# Patient Record
Sex: FEMALE | Race: WHITE | NOT HISPANIC OR LATINO | Employment: FULL TIME | URBAN - METROPOLITAN AREA
[De-identification: names, ages, dates, MRNs, and addresses within clinical notes are randomized per-mention and may not be internally consistent; named-entity substitution may affect disease eponyms.]

---

## 2017-01-16 ENCOUNTER — OFFICE VISIT (OUTPATIENT)
Dept: URGENT CARE | Facility: MEDICAL CENTER | Age: 22
End: 2017-01-16
Payer: COMMERCIAL

## 2017-01-16 PROCEDURE — 99213 OFFICE O/P EST LOW 20 MIN: CPT

## 2017-11-04 NOTE — PROGRESS NOTES
Assessment  1  History of Acute URI (465 9) (J06 9)   2  Acute URI (465 9) (J06 9)    Discussion/Summary  Discussion Summary:   1  Increase fluid intake  take much as directed for pain  1 more salt gargles, throat lozenges, honey ENT, for sore throat  Medication Side Effects Reviewed: Possible side effects of new medications were reviewed with the patient/guardian today  Understands and agrees with treatment plan: The treatment plan was reviewed with the patient/guardian  The patient/guardian understands and agrees with the treatment plan   Counseling Documentation With Imm: The patient was counseled regarding diagnostic results,-- instructions for management,-- risk factor reductions,-- prognosis,-- patient and family education,-- impressions,-- risks and benefits of treatment options,-- importance of compliance with treatment  Follow Up Instructions: Follow Up with your Primary Care Provider in 1-2 days  If your symptoms worsen, go to the nearest [place] [address] Emergency Department  Chief Complaint  1  Sore Throat  Chief Complaint Free Text Note Form: Pt c/o sore throat and ear pain x3 days  History of Present Illness  HPI: + cough congestion 2 days, sore throat, ear pain, + chills - fever, No OTC   Hospital Based Practices Required Assessment:   Pain Assessment   the patient states they have pain  The pain is located in the sore throat  The patient describes the pain as aching  (on a scale of 0 to 10, the patient rates the pain at 0 )   Abuse And Domestic Violence Screen    Yes, the patient is safe at home  -- The patient states no one is hurting them  Depression And Suicide Screen  No, the patient has not had thoughts of hurting themself  No, the patient has not felt depressed in the past 7 days  Prefered Language is  english  Primary Language is  english  Readiness To Learn: Receptive  Barriers To Learning: none     Preferred Learning: verbal      Review of Systems  Focused-Female: Constitutional: No fever, no chills, feels well, no tiredness, no recent weight gain or loss  ENT: no ear ache, no loss of hearing, no nosebleeds or nasal discharge, no sore throat or hoarseness-- and-- as noted in HPI  Cardiovascular: no complaints of slow or fast heart rate, no chest pain, no palpitations, no leg claudication or lower extremity edema  Respiratory: no complaints of shortness of breath, no wheezing, no dyspnea on exertion, no orthopnea or PND  Breasts: no complaints of breast pain, breast lump or nipple discharge  Gastrointestinal: no complaints of abdominal pain, no constipation, no nausea or diarrhea, no vomiting, no bloody stools  Genitourinary: no complaints of dysuria, no incontinence, no pelvic pain, no dysmenorrhea, no vaginal discharge or abnormal vaginal bleeding  Musculoskeletal: no complaints of arthralgia, no myalgia, no joint swelling or stiffness, no limb pain or swelling  Integumentary: no complaints of skin rash or lesion, no itching or dry skin, no skin wounds  Neurological: no complaints of headache, no confusion, no numbness or tingling, no dizziness or fainting  Active Problems  1  Anorexia nervosa (307 1) (F50 00)   2  Anxiety (300 00) (F41 9)   3  Borderline personality disorder (301 83) (F60 3)   4  Chest pain (786 50) (R07 9)   5  Encounter for routine gynecological examination (V72 31) (Z01 419)   6  Episodic mood disorder (296 90) (F39)   7  Insect bite of leg (916 4,E906 4) (B82 788I,F49  XXXA)   8  Lymphadenopathy (785 6) (R59 1)   9  Migraine (346 90) (G43 909)   10  Psychosis (298 9) (F29)    Past Medical History  1  History of Acute URI (465 9) (J06 9)   2  No pertinent past medical history  Active Problems And Past Medical History Reviewed: The active problems and past medical history were reviewed and updated today  Family History  Mother    1  No pertinent family history  Family History Reviewed:    The family history was reviewed and updated today  Social History   · Current every day smoker (305 1) (F17 200)  Social History Reviewed: The social history was reviewed and updated today  Surgical History  1  History of  Section   2  History of Foot Surgery  Surgical History Reviewed: The surgical history was reviewed and updated today  Current Meds  Medication List Reviewed: The medication list was reviewed and updated today  Allergies  1  Amoxicillin CAPS   2  RisperDAL TABS   3  Trimox CAPS    Vitals  Signs   Recorded: [id number] 11:20AM   Temperature: 98 4 F  Heart Rate: 92  Respiration: 18  Systolic: 903  Diastolic: 68  Height: 5 ft 1 in  Weight: 124 lb   BMI Calculated: 23 43  BSA Calculated: 1 54  O2 Saturation: 100, RA    Physical Exam    Constitutional   General appearance: No acute distress, well appearing and well nourished  Eyes   Conjunctiva and lids: No swelling, erythema or discharge  Pupils and irises: Equal, round and reactive to light  Ears, Nose, Mouth, and Throat   External inspection of ears and nose: Normal     Otoscopic examination: Tympanic membranes translucent with normal light reflex  Canals patent without erythema  Nasal mucosa, septum, and turbinates: Abnormal  -- Clear nasal mucosa discharge  Oropharynx: Abnormal  -- erythema posterior pharynx  Pulmonary   Respiratory effort: No increased work of breathing or signs of respiratory distress  Auscultation of lungs: Clear to auscultation  Cardiovascular   Auscultation of heart: Normal rate and rhythm, normal S1 and S2, without murmurs  Lymphatic   Palpation of lymph nodes in neck: No lymphadenopathy  Skin   Skin and subcutaneous tissue: Normal without rashes or lesions      Psychiatric   Orientation to person, place, and time: Normal     Mood and affect: Normal        Signatures   Electronically signed by : GHULAM Fernandez; 2017  4:36PM EST                       (Author)    Electronically signed by : AZEB Ayala ; Nov  3 2017  3:37PM EST                       (Co-author)

## 2018-06-01 ENCOUNTER — OFFICE VISIT (OUTPATIENT)
Dept: URGENT CARE | Facility: MEDICAL CENTER | Age: 23
End: 2018-06-01
Payer: COMMERCIAL

## 2018-06-01 ENCOUNTER — HOSPITAL ENCOUNTER (EMERGENCY)
Facility: HOSPITAL | Age: 23
Discharge: HOME | End: 2018-06-01
Admitting: EMERGENCY MEDICINE
Payer: COMMERCIAL

## 2018-06-01 ENCOUNTER — APPOINTMENT (EMERGENCY)
Dept: CT IMAGING | Facility: HOSPITAL | Age: 23
End: 2018-06-01
Payer: COMMERCIAL

## 2018-06-01 VITALS
HEIGHT: 61 IN | DIASTOLIC BLOOD PRESSURE: 90 MMHG | BODY MASS INDEX: 24.52 KG/M2 | SYSTOLIC BLOOD PRESSURE: 136 MMHG | OXYGEN SATURATION: 100 % | TEMPERATURE: 98.8 F | RESPIRATION RATE: 16 BRPM | WEIGHT: 129.85 LBS | HEART RATE: 85 BPM

## 2018-06-01 VITALS
HEART RATE: 96 BPM | RESPIRATION RATE: 20 BRPM | BODY MASS INDEX: 24.56 KG/M2 | WEIGHT: 130 LBS | SYSTOLIC BLOOD PRESSURE: 119 MMHG | DIASTOLIC BLOOD PRESSURE: 86 MMHG

## 2018-06-01 DIAGNOSIS — S06.0X0A CONCUSSION WITHOUT LOSS OF CONSCIOUSNESS, INITIAL ENCOUNTER: Primary | ICD-10-CM

## 2018-06-01 DIAGNOSIS — G44.309 POST-TRAUMATIC HEADACHE: Primary | ICD-10-CM

## 2018-06-01 LAB — EXT PREG TEST URINE: NEGATIVE

## 2018-06-01 PROCEDURE — 81025 URINE PREGNANCY TEST: CPT | Performed by: PHYSICIAN ASSISTANT

## 2018-06-01 PROCEDURE — 99213 OFFICE O/P EST LOW 20 MIN: CPT | Performed by: PHYSICIAN ASSISTANT

## 2018-06-01 PROCEDURE — 70450 CT HEAD/BRAIN W/O DYE: CPT

## 2018-06-01 PROCEDURE — 99284 EMERGENCY DEPT VISIT MOD MDM: CPT

## 2018-06-01 RX ORDER — ONDANSETRON 4 MG/1
4 TABLET, ORALLY DISINTEGRATING ORAL EVERY 8 HOURS PRN
Qty: 20 TABLET | Refills: 0 | Status: SHIPPED | OUTPATIENT
Start: 2018-06-01 | End: 2018-07-31 | Stop reason: ALTCHOICE

## 2018-06-01 NOTE — PATIENT INSTRUCTIONS
Pt sent to [facility] for further evaluation and work up that could not be performed at our facility  Pt's mother is driving
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
## 2018-06-02 NOTE — DISCHARGE INSTRUCTIONS
Concussion   WHAT YOU NEED TO KNOW:   A concussion is a mild brain injury  It is usually caused by a bump or blow to the head from a fall, a motor vehicle crash, or a sports injury  Sometimes being shaken forcefully may cause a concussion  DISCHARGE INSTRUCTIONS:   Have someone else call 911 for the following:   · Someone tries to wake you and cannot do so  · You have a seizure, increasing confusion, or a change in personality  · Your speech becomes slurred, or you have new vision problems  Return to the emergency department if:   · You have a severe headache that does not go away  · You have arm or leg weakness, numbness, or new problems with coordination  · You have blood or clear fluid coming out of the ears or nose  Contact your healthcare provider if:   · You have nausea or are vomiting  · You feel more sleepy than usual     · Your symptoms get worse  · Your symptoms last longer than 6 weeks after the injury  · You have questions or concerns about your condition or care  Medicines:   · Acetaminophen  helps to decrease pain  It is available without a doctor's order  Ask how much to take and how often to take it  Follow directions  Acetaminophen can cause liver damage if not taken correctly  · NSAIDs , such as ibuprofen, help decrease swelling and pain  NSAIDs can cause stomach bleeding or kidney problems in certain people  If you take blood thinner medicine, always ask your healthcare provider if NSAIDs are safe for you  Always read the medicine label and follow directions  · Take your medicine as directed  Contact your healthcare provider if you think your medicine is not helping or if you have side effects  Tell him or her if you are allergic to any medicine  Keep a list of the medicines, vitamins, and herbs you take  Include the amounts, and when and why you take them  Bring the list or the pill bottles to follow-up visits   Carry your medicine list with you in case of an emergency  Follow up with your healthcare provider as directed:  Write down your questions so you remember to ask them during your visits  Self-care:   · Rest  from physical and mental activities as directed  Mental activities are those that require thinking, concentration, and attention  You will need to rest until your symptoms are gone  Rest will allow you to recover from your concussion  Ask your healthcare provider when you can return to work and other daily activities  · Have someone stay with you for the first 24 hours after your injury  Your healthcare provider should be contacted if your symptoms get worse, or you develop new symptoms  · Do not participate in sports and physical activities until your healthcare provider says it is okay  They could make your symptoms worse or lead to another concussion  Your healthcare provider will tell you when it is okay for you to return to sports or physical activities  Prevent another concussion:   · Wear protective sports equipment that fit properly  Helmets help decrease your risk of a serious brain injury  Talk to your healthcare provider about ways you can decrease your risk for a concussion if you play sports  · Wear your seat belt  every time you travel  This helps to decrease your risk of a head injury if you are in a car accident  © 2017 [facility] Information is for End User's use only and may not be sold, redistributed or otherwise used for commercial purposes  All illustrations and images included in CareNotes® are the copyrighted property of [employer] , [employer]  or [employer]  The above information is an  only  It is not intended as medical advice for individual conditions or treatments  Talk to your doctor, nurse or pharmacist before following any medical regimen to see if it is safe and effective for you

## 2018-07-31 ENCOUNTER — OFFICE VISIT (OUTPATIENT)
Dept: OBGYN CLINIC | Facility: MEDICAL CENTER | Age: 23
End: 2018-07-31
Payer: COMMERCIAL

## 2018-07-31 VITALS
HEIGHT: 61 IN | BODY MASS INDEX: 24.35 KG/M2 | SYSTOLIC BLOOD PRESSURE: 122 MMHG | DIASTOLIC BLOOD PRESSURE: 62 MMHG | WEIGHT: 129 LBS

## 2018-07-31 DIAGNOSIS — Z01.419 ENCOUNTER FOR GYNECOLOGICAL EXAMINATION (GENERAL) (ROUTINE) WITHOUT ABNORMAL FINDINGS: Primary | ICD-10-CM

## 2018-07-31 DIAGNOSIS — N92.0 MENORRHAGIA WITH REGULAR CYCLE: ICD-10-CM

## 2018-07-31 DIAGNOSIS — Z76.89 ENCOUNTER FOR ASSESSMENT OF STD EXPOSURE: ICD-10-CM

## 2018-07-31 PROCEDURE — 87591 N.GONORRHOEAE DNA AMP PROB: CPT | Performed by: PHYSICIAN ASSISTANT

## 2018-07-31 PROCEDURE — 87491 CHLMYD TRACH DNA AMP PROBE: CPT | Performed by: PHYSICIAN ASSISTANT

## 2018-07-31 PROCEDURE — G0143 SCR C/V CYTO,THINLAYER,RESCR: HCPCS | Performed by: PHYSICIAN ASSISTANT

## 2018-07-31 PROCEDURE — 99385 PREV VISIT NEW AGE 18-39: CPT | Performed by: PHYSICIAN ASSISTANT

## 2018-07-31 NOTE — ASSESSMENT & PLAN NOTE
Discussed OCP vs Nuvaring vs Depo vs Nexplanon vs IUD  Prefer prog only method due to tobacco use and no intention to quit but would rx combined pill until she is older  Patient wants Mirena  Discussed risks, benefits and possible side effects    Instructions and check list reviewed RTO when complete  Will call when device received for insertion

## 2018-07-31 NOTE — PROGRESS NOTES
Assessment/Plan  Problem List Items Addressed This Visit     Encounter for gynecological examination (general) (routine) without abnormal findings - Primary     Annual exam and pap performed, will call if abnormal or send letter if normal  RTO 1 year         Relevant Orders    Chlamydia/GC amplified DNA by PCR    Liquid-based pap, screening    Menorrhagia with regular cycle     Discussed OCP vs Nuvaring vs Depo vs Nexplanon vs IUD  Prefer prog only method due to tobacco use and no intention to quit but would rx combined pill until she is older  Patient wants Mirena  Discussed risks, benefits and possible side effects  Instructions and check list reviewed RTO when complete  Will call when device received for insertion           Other Visit Diagnoses     Encounter for assessment of STD exposure        Relevant Orders    Chlamydia/GC amplified DNA by PCR        Juan Pope is a 24 y o  female who presents for a new patient annual GYN exam   Periods are regular every 28-30 days  Dysmenorrhea:severe, occurring first 1-2 days of flow  She denies intermenstrual bleeding, spotting, or discharge  She reports that she is sexually active with 1 partner and using tubal ligation for contraception  Patient states menses heavy 3 out of the 7 days, soaking pad every 2 hrs and also very painful where pt misses work sometimes due to pain  She states that she was on the pill and the patch in the past with no problems  (+) tobacco use - no desire to quit  Has tubal ligation for contraception      Last Pap smear: 2015 WNL per pt, no h/o dysplasia  Regular self breast exam: no    Family history of uterine or ovarian cancer: no  Family history of breast cancer: no  Family history of colon cancer: yes - MGF    Menstrual History:  OB History      Para Term  AB Living    2 2            SAB TAB Ectopic Multiple Live Births                      Menarche age: 14  Patient's last menstrual period was 2018 (exact date)  Period Cycle (Days): 28  Period Duration (Days): 6-7  Period Pattern: Regular  Menstrual Flow: Heavy  Menstrual Control: Tampon  Menstrual Control Change Freq (Hours): 2 hrs    Past Medical History:   Diagnosis Date    Migraine      Past Surgical History:   Procedure Laterality Date     SECTION      x 2 boys    FOOT SURGERY Right     TUBAL LIGATION Bilateral 2015     History reviewed  No pertinent family history  Review of Systems  Review of Systems   Constitutional: Negative for chills and fever  Respiratory: Negative for shortness of breath  Cardiovascular: Negative for chest pain  Gastrointestinal: Negative for abdominal pain  Genitourinary: Negative for dysuria, pelvic pain, vaginal bleeding, vaginal discharge and vaginal pain  Negative for breast pain or lumps  Negative for stress urinary incontinence  Neurological: Negative for headaches  Objective   /62   Ht 5' 1" (1 549 m)   Wt 58 5 kg (129 lb)   LMP 2018 (Exact Date)   BMI 24 37 kg/m²     Physical Exam   Constitutional: She is oriented to person, place, and time  She appears well-developed and well-nourished  Neck: No thyromegaly present  Cardiovascular: Normal rate and regular rhythm  Pulmonary/Chest: Effort normal and breath sounds normal  Right breast exhibits no mass, no nipple discharge, no skin change and no tenderness  Left breast exhibits no mass, no nipple discharge, no skin change and no tenderness  Abdominal: Soft  There is no tenderness  There is no rebound and no guarding  Genitourinary: There is no rash or lesion on the right labia  There is no rash or lesion on the left labia  Uterus is not enlarged and not tender  Cervix exhibits no motion tenderness and no discharge  Right adnexum displays no mass and no tenderness  Left adnexum displays no mass and no tenderness  No bleeding in the vagina  No vaginal discharge found     Genitourinary Comments: Pap performed   Neurological: She is alert and oriented to person, place, and time  Psychiatric: She has a normal mood and affect  Nursing note and vitals reviewed

## 2018-08-02 LAB
CHLAMYDIA DNA CVX QL NAA+PROBE: NORMAL
N GONORRHOEA DNA GENITAL QL NAA+PROBE: NORMAL

## 2018-08-03 LAB
LAB AP GYN PRIMARY INTERPRETATION: NORMAL
Lab: NORMAL
PATH INTERP SPEC-IMP: NORMAL

## 2018-08-06 ENCOUNTER — TELEPHONE (OUTPATIENT)
Dept: OBGYN CLINIC | Facility: CLINIC | Age: 23
End: 2018-08-06

## 2018-08-06 DIAGNOSIS — Z20.2 EXPOSURE TO SEXUALLY TRANSMITTED DISEASE (STD): Primary | ICD-10-CM

## 2018-08-06 RX ORDER — AZITHROMYCIN 500 MG/1
TABLET, FILM COATED ORAL
Qty: 2 TABLET | Refills: 0 | Status: SHIPPED | OUTPATIENT
Start: 2018-08-06 | End: 2018-08-06

## 2018-08-06 NOTE — TELEPHONE ENCOUNTER
----- Message from Eladio Garcia PA-C sent at 8/6/2018  7:49 AM EDT -----  Regarding: RE: GC/[facility] results  Sent in rx, this should have gone to provider on call since I wasn't in on Friday  Please call patient asap to inform I sent       ----- Message -----  From: Fletcher Becker RN  Sent: 8/3/2018   3:39 PM  To: Eladio Garcia PA-C  Subject: GC/[facility] results                                 Hey there,    Patient was calling regarding Chlamydia culture and pap result  Thought she was supposed to  antibiotic at pharmacy   Appear normal  Please advise

## 2018-08-09 ENCOUNTER — TELEPHONE (OUTPATIENT)
Dept: OBGYN CLINIC | Facility: CLINIC | Age: 23
End: 2018-08-09

## 2018-08-09 NOTE — TELEPHONE ENCOUNTER
Pt coming Monday in [place] with [facility] for Mirena insertion  She said she was told to call when she got her period, wanted to make sure device was up there

## 2018-08-14 ENCOUNTER — TELEPHONE (OUTPATIENT)
Dept: OBGYN CLINIC | Facility: CLINIC | Age: 23
End: 2018-08-14

## 2018-08-14 NOTE — TELEPHONE ENCOUNTER
[facility] called this morning to check status of IUD  They are still processing paperwork  Pt called and notified of where we are in the process of getting her IUD  Pt was given the phone number to [facility] to followup by next week if she doesn't hear from them  Pt was told we can then schedule her for insertion once we have the IUD here

## 2018-08-20 ENCOUNTER — TELEPHONE (OUTPATIENT)
Dept: OBGYN CLINIC | Facility: CLINIC | Age: 23
End: 2018-08-20

## 2018-08-20 NOTE — TELEPHONE ENCOUNTER
[facility] contacted today  IUD is covered under specialty pharmacy  Kellie Patel was contacted to call [facility] to confirm with them  She will do this today

## 2018-08-21 ENCOUNTER — TELEPHONE (OUTPATIENT)
Dept: OBGYN CLINIC | Facility: CLINIC | Age: 23
End: 2018-08-21

## 2018-08-24 NOTE — TELEPHONE ENCOUNTER
Mirena IUD received from specialty pharmacy today  Pt can be scheduled for insertion  Pt goes to [facility] office, will send IUD there

## 2019-07-26 ENCOUNTER — OFFICE VISIT (OUTPATIENT)
Dept: OBGYN CLINIC | Facility: CLINIC | Age: 24
End: 2019-07-26
Payer: COMMERCIAL

## 2019-07-26 VITALS — WEIGHT: 133 LBS | SYSTOLIC BLOOD PRESSURE: 122 MMHG | DIASTOLIC BLOOD PRESSURE: 70 MMHG | BODY MASS INDEX: 25.13 KG/M2

## 2019-07-26 DIAGNOSIS — R10.2 PELVIC PAIN: Primary | ICD-10-CM

## 2019-07-26 DIAGNOSIS — N92.0 MENORRHAGIA WITH REGULAR CYCLE: ICD-10-CM

## 2019-07-26 DIAGNOSIS — N76.0 BV (BACTERIAL VAGINOSIS): ICD-10-CM

## 2019-07-26 DIAGNOSIS — B96.89 BV (BACTERIAL VAGINOSIS): ICD-10-CM

## 2019-07-26 PROCEDURE — 99213 OFFICE O/P EST LOW 20 MIN: CPT | Performed by: NURSE PRACTITIONER

## 2019-07-26 RX ORDER — METRONIDAZOLE 500 MG/1
500 TABLET ORAL EVERY 12 HOURS SCHEDULED
Qty: 14 TABLET | Refills: 0 | Status: SHIPPED | OUTPATIENT
Start: 2019-07-26 | End: 2019-08-02

## 2019-07-26 NOTE — ASSESSMENT & PLAN NOTE
BV present and fullness noted over LLQ with exam  Discussed possible causes of pelvic pain incl gyn and GI etiologies  Advised pelvic US to eval adnexa  The patient declines gc/chl, as these were recently neg with current partner  She will complete Flagyl for BV and we will follow up when results of pelvic US are available for review

## 2019-07-26 NOTE — ASSESSMENT & PLAN NOTE
The patient has been previously counseled on risks/benefits of Mirena IUD for management of menorrhagia, and she expresses desire to proceed with this plan  Staff was notified   Dwight Doe was advised to schedule insertion with menses

## 2019-07-26 NOTE — ASSESSMENT & PLAN NOTE
Exam c/w Bv  Recommended Flagyl, conservative vulvar hygiene, pelvic rest  Reviewed directions for use, risks/benefits, antabuse effects  F/u PRN if sx not improved  Advised IUD insertion cannot be performed unless BV is resolved due to risk of PID

## 2019-07-26 NOTE — PROGRESS NOTES
Assessment/Plan:    BV (bacterial vaginosis)  Exam c/w Bv  Recommended Flagyl, conservative vulvar hygiene, pelvic rest  Reviewed directions for use, risks/benefits, antabuse effects  F/u PRN if sx not improved  Advised IUD insertion cannot be performed unless BV is resolved due to risk of PID  Menorrhagia with regular cycle  The patient has been previously counseled on risks/benefits of Mirena IUD for management of menorrhagia, and she expresses desire to proceed with this plan  Staff was notified  Richardson Araujo was advised to schedule insertion with menses     Pelvic pain  BV present and fullness noted over LLQ with exam  Discussed possible causes of pelvic pain incl gyn and GI etiologies  Advised pelvic US to eval adnexa  The patient declines gc/chl, as these were recently neg with current partner  She will complete Flagyl for BV and we will follow up when results of pelvic US are available for review  Diagnoses and all orders for this visit:    Pelvic pain  -     US pelvis complete w transvaginal; Future    BV (bacterial vaginosis)  -     metroNIDAZOLE (FLAGYL) 500 mg tablet; Take 1 tablet (500 mg total) by mouth every 12 (twelve) hours for 7 days    Menorrhagia with regular cycle          Subjective:      Patient ID: Tj Cedillo is a 21 y o  female  This patient presents with c/o pelvic pain  Present for 2 months   Described as low midline, suprapubic and sometimes radiating into LLQ   She denies remitting factors   Exacerbated by intercourse   She denies abn discharge, abn bleeding, STI concerns, urinary complaints, C/D/N/V, fever or chills   Menses are historically heavy and this is unchanged   She would like to have Mirena for management    Same partner for >1 yr and gc/chl were neg   BTL for contra   S/p C/s x2          The following portions of the patient's history were reviewed and updated as appropriate: allergies, current medications, past family history, past medical history, past social history, past surgical history and problem list     Review of Systems   Constitutional: Negative  Respiratory: Negative  Cardiovascular: Negative  Gastrointestinal: Negative  Genitourinary: Positive for dyspareunia, menstrual problem and pelvic pain  Negative for dysuria, frequency, vaginal bleeding and vaginal discharge  Musculoskeletal: Negative  Skin: Negative  Neurological: Negative  Psychiatric/Behavioral: Negative  Objective:      /70   Wt 60 3 kg (133 lb)   LMP 07/04/2019   BMI 25 13 kg/m²          Physical Exam   Constitutional: She is oriented to person, place, and time  She appears well-developed and well-nourished  HENT:   Head: Normocephalic and atraumatic  Eyes: Pupils are equal, round, and reactive to light  Neck: Normal range of motion  Pulmonary/Chest: Effort normal    Abdominal: Hernia confirmed negative in the right inguinal area and confirmed negative in the left inguinal area  Genitourinary: Rectum normal and uterus normal  There is no rash, tenderness, lesion or injury on the right labia  There is no rash, tenderness, lesion or injury on the left labia  Cervix exhibits no motion tenderness, no discharge and no friability  Right adnexum displays no mass, no tenderness and no fullness  Left adnexum displays fullness  Left adnexum displays no mass and no tenderness  No erythema, tenderness or bleeding in the vagina  Vaginal discharge found  Musculoskeletal: Normal range of motion  Lymphadenopathy:        Right: No inguinal adenopathy present  Left: No inguinal adenopathy present  Neurological: She is alert and oriented to person, place, and time  Skin: Skin is warm and dry  Psychiatric: She has a normal mood and affect   Her behavior is normal  Judgment and thought content normal

## 2019-07-29 ENCOUNTER — TELEPHONE (OUTPATIENT)
Dept: OBGYN CLINIC | Facility: CLINIC | Age: 24
End: 2019-07-29

## 2019-07-30 NOTE — TELEPHONE ENCOUNTER
Buy and bill [facility]  Pt can be scheduled for insertion

## 2019-07-31 ENCOUNTER — HOSPITAL ENCOUNTER (OUTPATIENT)
Dept: RADIOLOGY | Age: 24
Discharge: HOME | End: 2019-07-31
Payer: COMMERCIAL

## 2019-07-31 DIAGNOSIS — R10.2 PELVIC PAIN: ICD-10-CM

## 2019-07-31 PROCEDURE — 76830 TRANSVAGINAL US NON-OB: CPT

## 2019-07-31 PROCEDURE — 76856 US EXAM PELVIC COMPLETE: CPT

## 2019-08-05 ENCOUNTER — TELEPHONE (OUTPATIENT)
Dept: OBGYN CLINIC | Facility: CLINIC | Age: 24
End: 2019-08-05

## 2019-08-05 NOTE — TELEPHONE ENCOUNTER
----- Message from Su Stevens PA-C sent at 8/5/2019 10:00 AM EDT -----  This is a [facility] patient - please let her know that her pelvic US is normal  Thank you!

## 2019-08-09 ENCOUNTER — PROCEDURE VISIT (OUTPATIENT)
Dept: OBGYN CLINIC | Facility: CLINIC | Age: 24
End: 2019-08-09
Payer: COMMERCIAL

## 2019-08-09 VITALS — WEIGHT: 133.6 LBS | DIASTOLIC BLOOD PRESSURE: 74 MMHG | SYSTOLIC BLOOD PRESSURE: 120 MMHG | BODY MASS INDEX: 25.24 KG/M2

## 2019-08-09 DIAGNOSIS — Z30.430 ENCOUNTER FOR IUD INSERTION: Primary | ICD-10-CM

## 2019-08-09 PROBLEM — N76.0 BV (BACTERIAL VAGINOSIS): Status: RESOLVED | Noted: 2019-07-26 | Resolved: 2019-08-09

## 2019-08-09 PROBLEM — B96.89 BV (BACTERIAL VAGINOSIS): Status: RESOLVED | Noted: 2019-07-26 | Resolved: 2019-08-09

## 2019-08-09 PROCEDURE — 81025 URINE PREGNANCY TEST: CPT | Performed by: NURSE PRACTITIONER

## 2019-08-09 PROCEDURE — 58300 INSERT INTRAUTERINE DEVICE: CPT | Performed by: NURSE PRACTITIONER

## 2019-08-09 NOTE — ASSESSMENT & PLAN NOTE
BV is now resolved and recent pelvic ultrasound normal  IUD inserted without difficulty  Reviewed reasons to call and sx to report including excessive bleeding, pain unrelieved by ibuprofen or symptoms of infection such as fever, chills or foul smelling discharge  Recommended returning to the office in 4- 6 weeks for IUD string check  The patient agrees to the plan

## 2019-08-09 NOTE — PROGRESS NOTES
Iud insertions  Date/Time: 8/9/2019 7:20 AM  Performed by: LEANN Corral  Authorized by: LEANN Corarl     Consent:     Consent obtained:  Verbal and written    Consent given by:  Patient    Procedure risks and benefits discussed: yes      Patient questions answered: yes      Patient agrees, verbalizes understanding, and wants to proceed: yes      Educational handouts given: yes    Procedure:     Pelvic exam performed: yes      Negative GC/chlamydia test: recent negative on 7/31/19  Negative urine pregnancy test: yes      Cervix cleaned and prepped: yes      Speculum placed in vagina: yes      Tenaculum applied to cervix: yes      Uterus sounded: yes      Uterus sound depth (cm):  8    IUD inserted with no complications: yes      IUD type:  Mirena    Strings trimmed: yes    Post-procedure:     Patient tolerated procedure well: yes      Patient will follow up after next period: yes    Comments: This patient presents for IUD insertion  The risks/benefits, SE's/AE's were reviewed and all questions were answered  Written and verbal consent were obtained by myself  Time out was performed and allergies were confirmed  The patient was positioned in lithotomy position and speculum was inserted  Cervix was visualized and cleansed with betadine  Tenaculum was applied to the anterior cervix  Uterus sounded to 8cm  The IUD was inserted without difficulty and the strings were trimmed  Hemostasis was observed and all instruments were removed  The patient tolerated well

## 2020-04-03 DIAGNOSIS — B96.89 BV (BACTERIAL VAGINOSIS): ICD-10-CM

## 2020-04-03 DIAGNOSIS — N76.0 BV (BACTERIAL VAGINOSIS): ICD-10-CM

## 2020-04-06 ENCOUNTER — TELEMEDICINE (OUTPATIENT)
Dept: OBGYN CLINIC | Facility: CLINIC | Age: 25
End: 2020-04-06
Payer: COMMERCIAL

## 2020-04-06 DIAGNOSIS — N76.0 ACUTE VAGINITIS: Primary | ICD-10-CM

## 2020-04-06 DIAGNOSIS — Z97.5 IUD CONTRACEPTION: ICD-10-CM

## 2020-04-06 DIAGNOSIS — N92.0 MENORRHAGIA WITH REGULAR CYCLE: ICD-10-CM

## 2020-04-06 PROCEDURE — 99213 OFFICE O/P EST LOW 20 MIN: CPT | Performed by: NURSE PRACTITIONER

## 2020-04-06 RX ORDER — METRONIDAZOLE 500 MG/1
500 TABLET ORAL EVERY 12 HOURS SCHEDULED
Qty: 14 TABLET | Refills: 0 | Status: SHIPPED | OUTPATIENT
Start: 2020-04-06 | End: 2020-04-13

## 2020-04-06 NOTE — TELEPHONE ENCOUNTER
Please contact the patient to inquire about this refill request  Is she having sx of BV and requested this or was it an auto-refill request?

## 2021-01-01 ENCOUNTER — APPOINTMENT (EMERGENCY)
Dept: ULTRASOUND IMAGING | Facility: HOSPITAL | Age: 26
End: 2021-01-01
Payer: COMMERCIAL

## 2021-01-01 ENCOUNTER — HOSPITAL ENCOUNTER (EMERGENCY)
Facility: HOSPITAL | Age: 26
Discharge: HOME | End: 2021-01-01
Attending: EMERGENCY MEDICINE | Admitting: EMERGENCY MEDICINE
Payer: COMMERCIAL

## 2021-01-01 VITALS
OXYGEN SATURATION: 100 % | TEMPERATURE: 98.5 F | SYSTOLIC BLOOD PRESSURE: 121 MMHG | RESPIRATION RATE: 16 BRPM | DIASTOLIC BLOOD PRESSURE: 85 MMHG | HEART RATE: 92 BPM

## 2021-01-01 DIAGNOSIS — T83.32XA IUD MIGRATION, INITIAL ENCOUNTER: ICD-10-CM

## 2021-01-01 DIAGNOSIS — R10.2 PELVIC PAIN: Primary | ICD-10-CM

## 2021-01-01 LAB
ANION GAP SERPL CALCULATED.3IONS-SCNC: 11 MMOL/L (ref 4–13)
BACTERIA UR QL AUTO: ABNORMAL /HPF
BASOPHILS # BLD AUTO: 0.02 THOUSANDS/ΜL (ref 0–0.1)
BASOPHILS NFR BLD AUTO: 0 % (ref 0–1)
BILIRUB UR QL STRIP: NEGATIVE
BUN SERPL-MCNC: 12 MG/DL (ref 5–25)
CALCIUM SERPL-MCNC: 8.9 MG/DL (ref 8.3–10.1)
CHLORIDE SERPL-SCNC: 102 MMOL/L (ref 100–108)
CLARITY UR: CLEAR
CO2 SERPL-SCNC: 26 MMOL/L (ref 21–32)
COLOR UR: YELLOW
CREAT SERPL-MCNC: 0.84 MG/DL (ref 0.6–1.3)
EOSINOPHIL # BLD AUTO: 0.08 THOUSAND/ΜL (ref 0–0.61)
EOSINOPHIL NFR BLD AUTO: 1 % (ref 0–6)
ERYTHROCYTE [DISTWIDTH] IN BLOOD BY AUTOMATED COUNT: 13.1 % (ref 11.6–15.1)
EXT PREG TEST URINE: NEGATIVE
EXT. CONTROL ED NAV: NORMAL
GFR SERPL CREATININE-BSD FRML MDRD: 96 ML/MIN/1.73SQ M
GLUCOSE SERPL-MCNC: 79 MG/DL (ref 65–140)
GLUCOSE UR STRIP-MCNC: NEGATIVE MG/DL
HCT VFR BLD AUTO: 39.6 % (ref 34.8–46.1)
HGB BLD-MCNC: 13.1 G/DL (ref 11.5–15.4)
HGB UR QL STRIP.AUTO: ABNORMAL
IMM GRANULOCYTES # BLD AUTO: 0.02 THOUSAND/UL (ref 0–0.2)
IMM GRANULOCYTES NFR BLD AUTO: 0 % (ref 0–2)
KETONES UR STRIP-MCNC: NEGATIVE MG/DL
LEUKOCYTE ESTERASE UR QL STRIP: ABNORMAL
LYMPHOCYTES # BLD AUTO: 2.14 THOUSANDS/ΜL (ref 0.6–4.47)
LYMPHOCYTES NFR BLD AUTO: 28 % (ref 14–44)
MCH RBC QN AUTO: 30.3 PG (ref 26.8–34.3)
MCHC RBC AUTO-ENTMCNC: 33.1 G/DL (ref 31.4–37.4)
MCV RBC AUTO: 92 FL (ref 82–98)
MONOCYTES # BLD AUTO: 0.48 THOUSAND/ΜL (ref 0.17–1.22)
MONOCYTES NFR BLD AUTO: 6 % (ref 4–12)
NEUTROPHILS # BLD AUTO: 4.91 THOUSANDS/ΜL (ref 1.85–7.62)
NEUTS SEG NFR BLD AUTO: 65 % (ref 43–75)
NITRITE UR QL STRIP: NEGATIVE
NON-SQ EPI CELLS URNS QL MICRO: ABNORMAL /HPF
NRBC BLD AUTO-RTO: 0 /100 WBCS
PH UR STRIP.AUTO: 8 [PH]
PLATELET # BLD AUTO: 234 THOUSANDS/UL (ref 149–390)
PMV BLD AUTO: 10.3 FL (ref 8.9–12.7)
POTASSIUM SERPL-SCNC: 3.5 MMOL/L (ref 3.5–5.3)
PROT UR STRIP-MCNC: NEGATIVE MG/DL
RBC # BLD AUTO: 4.32 MILLION/UL (ref 3.81–5.12)
RBC #/AREA URNS AUTO: ABNORMAL /HPF
SODIUM SERPL-SCNC: 139 MMOL/L (ref 136–145)
SP GR UR STRIP.AUTO: 1.01 (ref 1–1.03)
UROBILINOGEN UR QL STRIP.AUTO: 0.2 E.U./DL
WBC # BLD AUTO: 7.65 THOUSAND/UL (ref 4.31–10.16)
WBC #/AREA URNS AUTO: ABNORMAL /HPF

## 2021-01-01 PROCEDURE — 96372 THER/PROPH/DIAG INJ SC/IM: CPT

## 2021-01-01 PROCEDURE — 99284 EMERGENCY DEPT VISIT MOD MDM: CPT

## 2021-01-01 PROCEDURE — 87591 N.GONORRHOEAE DNA AMP PROB: CPT | Performed by: PHYSICIAN ASSISTANT

## 2021-01-01 PROCEDURE — NC001 PR NO CHARGE: Performed by: PHYSICIAN ASSISTANT

## 2021-01-01 PROCEDURE — 80048 BASIC METABOLIC PNL TOTAL CA: CPT | Performed by: PHYSICIAN ASSISTANT

## 2021-01-01 PROCEDURE — 36415 COLL VENOUS BLD VENIPUNCTURE: CPT | Performed by: PHYSICIAN ASSISTANT

## 2021-01-01 PROCEDURE — NC001 PR NO CHARGE: Performed by: STUDENT IN AN ORGANIZED HEALTH CARE EDUCATION/TRAINING PROGRAM

## 2021-01-01 PROCEDURE — 81001 URINALYSIS AUTO W/SCOPE: CPT | Performed by: PHYSICIAN ASSISTANT

## 2021-01-01 PROCEDURE — 76830 TRANSVAGINAL US NON-OB: CPT

## 2021-01-01 PROCEDURE — 85025 COMPLETE CBC W/AUTO DIFF WBC: CPT | Performed by: PHYSICIAN ASSISTANT

## 2021-01-01 PROCEDURE — 81025 URINE PREGNANCY TEST: CPT | Performed by: PHYSICIAN ASSISTANT

## 2021-01-01 PROCEDURE — 76856 US EXAM PELVIC COMPLETE: CPT

## 2021-01-01 PROCEDURE — 99285 EMERGENCY DEPT VISIT HI MDM: CPT | Performed by: PHYSICIAN ASSISTANT

## 2021-01-01 PROCEDURE — 87491 CHLMYD TRACH DNA AMP PROBE: CPT | Performed by: PHYSICIAN ASSISTANT

## 2021-01-01 RX ORDER — MEDROXYPROGESTERONE ACETATE 150 MG/ML
150 INJECTION, SUSPENSION INTRAMUSCULAR ONCE
Status: COMPLETED | OUTPATIENT
Start: 2021-01-01 | End: 2021-01-01

## 2021-01-01 RX ADMIN — MEDROXYPROGESTERONE ACETATE 150 MG: 150 INJECTION, SUSPENSION, EXTENDED RELEASE INTRAMUSCULAR at 22:07

## 2021-01-01 NOTE — ED PROVIDER NOTES
History  Chief Complaint   Patient presents with    Pelvis Injury     pt reports pelvic pain and vaginal bleeding x2 days  believes IUD moved  Patient presents to the emergency room with complaints of 2 day history of pelvic pain  She describes it as cramping  She has been spotting since the onset  She last had a normal   One week ago  She denies any vaginal discharge  She states it does not hurt when she has sexual intercourse but when she was finished she had some cramping following the event  She denies any chance of pregnancy as she has an IUD that was placed in October of 2019  She has not had any problems with her IUD  Her partner is present and he is asymptomatic for dysuria or urethral discharge  She denies any fever chills  Past medical history is positive for migraines  She has had 2 C section deliveries, foot surgery, tubal ligation  History provided by:  Patient  Abdominal Pain  Pain location:  RLQ and LLQ  Pain quality: cramping    Pain radiation: both anterior thighs    Pain severity:  Mild  Onset quality:  Gradual  Duration:  2 days  Timing:  Intermittent  Progression:  Waxing and waning  Chronicity:  New  Context: not alcohol use, not awakening from sleep, not diet changes, not eating, not laxative use, not medication withdrawal, not previous surgeries, not recent illness, not recent sexual activity, not recent travel, not retching, not sick contacts, not suspicious food intake and not trauma    Relieved by:  Nothing  Worsened by:  Palpation and movement  Ineffective treatments:  None tried  Associated symptoms: vaginal bleeding    Associated symptoms: no anorexia, no belching, no chest pain, no chills, no constipation, no cough, no diarrhea, no dysuria, no fatigue, no fever, no flatus, no hematemesis, no hematochezia, no hematuria, no melena, no nausea, no shortness of breath, no sore throat, no vaginal discharge and no vomiting    Risk factors: no alcohol abuse, no aspirin use, not elderly, has not had multiple surgeries, no NSAID use, not obese, not pregnant and no recent hospitalization        None       Past Medical History:   Diagnosis Date    IUD contraception 2019    Mirena     Migraine        Past Surgical History:   Procedure Laterality Date     SECTION      x 2 boys    FOOT SURGERY Right     TUBAL LIGATION Bilateral 2015       History reviewed  No pertinent family history  I have reviewed and agree with the history as documented  E-Cigarette/Vaping     E-Cigarette/Vaping Substances     Social History     Tobacco Use    Smoking status: Current Every Day Smoker     Packs/day: 0 50    Smokeless tobacco: Never Used   Substance Use Topics    Alcohol use: Yes     Comment: Social    Drug use: No       Review of Systems   Constitutional: Positive for activity change  Negative for appetite change, chills, diaphoresis, fatigue and fever  HENT: Negative for sore throat  Respiratory: Negative for cough and shortness of breath  Cardiovascular: Negative for chest pain  Gastrointestinal: Positive for abdominal pain  Negative for anorexia, constipation, diarrhea, flatus, hematemesis, hematochezia, melena, nausea and vomiting  Genitourinary: Positive for vaginal bleeding  Negative for dysuria, frequency, hematuria, urgency and vaginal discharge  Musculoskeletal: Negative for back pain  Skin: Negative for color change and rash  Psychiatric/Behavioral: Negative for confusion  All other systems reviewed and are negative  Physical Exam  Physical Exam  Vitals signs and nursing note reviewed  Constitutional:       General: She is not in acute distress  Appearance: Normal appearance  She is normal weight  She is not ill-appearing, toxic-appearing or diaphoretic  HENT:      Head: Normocephalic and atraumatic  Right Ear: External ear normal       Left Ear: External ear normal    Eyes:      General:         Right eye: No discharge  Left eye: No discharge  Conjunctiva/sclera: Conjunctivae normal    Cardiovascular:      Rate and Rhythm: Normal rate and regular rhythm  Pulses: Normal pulses  Heart sounds: Normal heart sounds  Pulmonary:      Effort: Pulmonary effort is normal       Breath sounds: Normal breath sounds  Abdominal:      General: Abdomen is flat  There is no distension  Tenderness: There is abdominal tenderness  There is no right CVA tenderness, left CVA tenderness, guarding or rebound  Comments: Bilateral adenexal tenderness  Skin:     General: Skin is warm  Capillary Refill: Capillary refill takes less than 2 seconds  Findings: No rash  Neurological:      Mental Status: She is alert and oriented to person, place, and time  Psychiatric:         Mood and Affect: Mood normal          Behavior: Behavior normal          Thought Content:  Thought content normal          Judgment: Judgment normal          Vital Signs  ED Triage Vitals   Temperature Pulse Respirations Blood Pressure SpO2   01/01/21 1848 01/01/21 1848 01/01/21 1848 01/01/21 1848 01/01/21 1848   98 5 °F (36 9 °C) 93 18 135/85 100 %      Temp Source Heart Rate Source Patient Position - Orthostatic VS BP Location FiO2 (%)   01/01/21 1848 01/01/21 1848 -- 01/01/21 2018 --   Oral Monitor  Left arm       Pain Score       --                  Vitals:    01/01/21 1848 01/01/21 2018 01/01/21 2159   BP: 135/85 125/81 121/85   Pulse: 93 94 92         Visual Acuity      ED Medications  Medications   medroxyPROGESTERone (DEPO-PROVERA) IM injection 150 mg (150 mg Intramuscular Given 1/1/21 2207)       Diagnostic Studies  Results Reviewed     Procedure Component Value Units Date/Time    Chlamydia/GC amplified DNA by PCR [[id number]]  (Abnormal) Collected: 01/01/21 1900    Lab Status: Final result Specimen: Urine, Other Updated: 01/02/21 0713     N gonorrhoeae, DNA Probe Negative     Chlamydia trachomatis, DNA Probe Positive    Narrative:      Test performed using PCR amplification of target DNA  This test is intended as an aid in the diagnosis of Chlamydial and gonococcal disease  This test has not been evaluated in patients younger than 15years of age and is not recommended for evaluation of suspected sexual abuse  Additional testing is recommended when the results do not correlate with clinical signs and symptoms        Urine Microscopic [[id number]]  (Abnormal) Collected: 01/01/21 1900    Lab Status: Final result Specimen: Urine, Other Updated: 01/01/21 2013     RBC, UA 2-4 /hpf      WBC, UA 4-10 /hpf      Epithelial Cells Occasional /hpf      Bacteria, UA Occasional /hpf     Basic metabolic panel [[id number]] Collected: 01/01/21 1916    Lab Status: Final result Specimen: Blood from Arm, Right Updated: 01/01/21 1937     Sodium 139 mmol/L      Potassium 3 5 mmol/L      Chloride 102 mmol/L      CO2 26 mmol/L      ANION GAP 11 mmol/L      BUN 12 mg/dL      Creatinine 0 84 mg/dL      Glucose 79 mg/dL      Calcium 8 9 mg/dL      eGFR 96 ml/min/1 73sq m     Narrative:      [place] guidelines for Chronic Kidney Disease (CKD):     Stage 1 with normal or high GFR (GFR > 90 mL/min/1 73 square meters)    Stage 2 Mild CKD (GFR = 60-89 mL/min/1 73 square meters)    Stage 3A Moderate CKD (GFR = 45-59 mL/min/1 73 square meters)    Stage 3B Moderate CKD (GFR = 30-44 mL/min/1 73 square meters)    Stage 4 Severe CKD (GFR = 15-29 mL/min/1 73 square meters)    Stage 5 End Stage CKD (GFR <15 mL/min/1 73 square meters)  Note: GFR calculation is accurate only with a steady state creatinine    CBC and differential [[id number]] Collected: 01/01/21 1916    Lab Status: Final result Specimen: Blood from Arm, Right Updated: 01/01/21 1933     WBC 7 65 Thousand/uL      RBC 4 32 Million/uL      Hemoglobin 13 1 g/dL      Hematocrit 39 6 %      MCV 92 fL      MCH 30 3 pg      MCHC 33 1 g/dL      RDW 13 1 %      MPV 10 3 fL      Platelets 681 Thousands/uL nRBC 0 /100 WBCs      Neutrophils Relative 65 %      Immat GRANS % 0 %      Lymphocytes Relative 28 %      Monocytes Relative 6 %      Eosinophils Relative 1 %      Basophils Relative 0 %      Neutrophils Absolute 4 91 Thousands/µL      Immature Grans Absolute 0 02 Thousand/uL      Lymphocytes Absolute 2 14 Thousands/µL      Monocytes Absolute 0 48 Thousand/µL      Eosinophils Absolute 0 08 Thousand/µL      Basophils Absolute 0 02 Thousands/µL     UA (URINE) with reflex to Scope [[id number]]  (Abnormal) Collected: 01/01/21 1900    Lab Status: Final result Specimen: Urine, Other Updated: 01/01/21 1920     Color, UA Yellow     Clarity, UA Clear     Specific Gravity, UA 1 015     pH, UA 8 0     Leukocytes, UA Small     Nitrite, UA Negative     Protein, UA Negative mg/dl      Glucose, UA Negative mg/dl      Ketones, UA Negative mg/dl      Urobilinogen, UA 0 2 E U /dl      Bilirubin, UA Negative     Blood, UA Large    POCT pregnancy, urine [[id number]]  (Normal) Resulted: 01/01/21 1904    Lab Status: Final result Updated: 01/01/21 1905     EXT PREG TEST UR (Ref: Negative) negative     Control valid                 US pelvis complete w transvaginal   ED Interpretation by Camelia Amin PA-C (01/01 2039)   Low position of an IUD extending into the endocervix  Final Result by Harsh Arnold MD (01/01 2037)      Low position of an IUD extending into the endocervix  The study was marked in [facility] for immediate notification  Workstation performed: [id number]                    Procedures  Procedures         ED Course  ED Course as of Jan 02 1226   [facility] 2021 2051 Patient signed out to abdomen all the  Awaiting the gyn consult and final disposition  SBIRT 22yo+      Most Recent Value   SBIRT (24 yo +)   In order to provide better care to our patients, we are screening all of our patients for alcohol and drug use   Would it be okay to ask you these screening questions? No Filed at: 01/01/2021 2200                    [facility]  Number of Diagnoses or Management Options  IUD migration, initial encounter: new and requires workup  Pelvic pain: new and requires workup     Amount and/or Complexity of Data Reviewed  Clinical lab tests: ordered and reviewed  Tests in the radiology section of CPT®: ordered and reviewed  Tests in the medicine section of CPT®: ordered and reviewed    Risk of Complications, Morbidity, and/or Mortality  Presenting problems: high  Diagnostic procedures: high  Management options: high  General comments: Patient presents emergency room with complaints of pelvic pain and bleeding for the past 2 days  Her most recent period ended a week ago  She has a history of having an IUD in questions whether not it is out of place  She does not complain of pain with intercourse but does have cramping afterward  She was seen and evaluated  Laboratory studies were taken and were reviewed  Ultrasound of her pelvis was performed which demonstrated the IUD to be in the endocervical canal   A consult for gynecology was placed  They came down to the emergency room and removed the IUD  Patient was discharged home was instructed to follow up with her gynecologist     Patient Progress  Patient progress: stable      Disposition  Final diagnoses:   Pelvic pain   IUD migration, initial encounter     Time reflects when diagnosis was documented in both MDM as applicable and the Disposition within this note     Time User Action Codes Description Comment    1/1/2021  8:43 PM Ledy Fitting [R10 2] Pelvic pain     1/1/2021  8:43 PM Berna Perdue IUD migration, initial encounter       ED Disposition     ED Disposition Condition Date/Time Comment    Discharge Stable Fri Jan 1, 2021 10:27 PM Heydi Francisco discharge to home/self care              Follow-up Information     Follow up With Specialties Details Why Contact Info    your Gynecologist [address] [facility] Schedule an appointment as soon as possible for a visit  [id number] [facility] [address] [facility]  [phone]            There are no discharge medications for this patient  No discharge procedures on file      PDMP Review     None          ED Provider  Electronically Signed by           Miguel Wagner PA-C  01/02/21 6380

## 2021-01-02 LAB
C TRACH DNA SPEC QL NAA+PROBE: POSITIVE
N GONORRHOEA DNA SPEC QL NAA+PROBE: NEGATIVE

## 2021-01-02 NOTE — RESULT ENCOUNTER NOTE
Patient returned call to the emergency department  Name and date of birth use as positive patient identifiers  Made aware of test result which was positive for chlamydia  Prescription for doxycycline 100 mg PO BID   For 7 days called in to [facility] on [address] in [place]

## 2021-01-02 NOTE — CONSULTS
Consultation - Gynecology  Abeba Madsen 31 y o  female MRN: [id number]  Unit/Bed#:  Encounter: [id number]      Consults      Chief Complaint   Patient presents with    Pelvis Injury     pt reports pelvic pain and vaginal bleeding x2 days  believes IUD moved  History of Present Illness   Physician Requesting Consult: No att  providers found  Reason for Consult / Principal Problem: IUD in IVAN, bleeding cramping  Subspeciality: General GYN  HPI: Abeba Madsen is a 31 y o  female who presents with a multiday history of heavy bleeding and cramping  She has had a Mirena IUD she has been very happy with for 2 years however had pain and heavier than usual bleeding for the past couple days and so presented to the ED  UPT was negative  TVUS revealed IUD in lower uterine segment extending into endocervical canal     Patient counseled on options of removal versus leaving in situ with back up method of contraception as effectiveness in lessened when malpositioned  Patient opted for removal secondary to pain  She chose Depo Provera as a bridge before being able to replace her IUD  Review of Systems   Constitutional: Negative  HENT: Negative  Eyes: Negative  Respiratory: Negative  Cardiovascular: Negative  Gastrointestinal: Negative  Endocrine: Negative  Genitourinary: Positive for pelvic pain and vaginal bleeding  Musculoskeletal: Negative  Skin: Negative  Neurological: Negative  Psychiatric/Behavioral: Negative          Historical Information   Past Medical History:   Diagnosis Date    IUD contraception 2019    Mirena     Migraine      Past Surgical History:   Procedure Laterality Date     SECTION      x 2 boys    FOOT SURGERY Right     TUBAL LIGATION Bilateral 2015     OB History    Para Term  AB Living   2 2           SAB TAB Ectopic Multiple Live Births                  # Outcome Date GA Lbr Negro/2nd Weight Sex Delivery Anes PTL Lv   2 Para 09/26/15    M CS-Unspec         Birth Comments: Tanisha Bostons"   1 Para 10/29/13    M CS-Unspec         Birth Comments: "Tor Urena"     History reviewed  No pertinent family history  Social History   Social History     Substance and Sexual Activity   Alcohol Use Yes    Comment: Social     Social History     Substance and Sexual Activity   Drug Use No     Social History     Tobacco Use   Smoking Status Current Every Day Smoker    Packs/day: 0 50   Smokeless Tobacco Never Used       Meds/Allergies   Current Facility-Administered Medications   Medication Dose Route Frequency    medroxyPROGESTERone (DEPO-PROVERA) IM injection 150 mg  150 mg Intramuscular Once         Allergies   Allergen Reactions    Risperidone Hives    Trimox [Amoxicillin] Hives     Reaction Date: 16Jun2011;        Objective   Vitals: Blood pressure 121/85, pulse 92, temperature 98 5 °F (36 9 °C), temperature source Oral, resp  rate 16, SpO2 100 %  There is no height or weight on file to calculate BMI  No intake or output data in the 24 hours ending 01/01/21 2200    Invasive Devices     Peripheral Intravenous Line            Peripheral IV 01/01/21 Right Antecubital less than 1 day                Physical Exam  Constitutional:       General: She is not in acute distress  Appearance: She is well-developed  She is not diaphoretic  HENT:      Head: Normocephalic and atraumatic  Eyes:      Pupils: Pupils are equal, round, and reactive to light  Neck:      Musculoskeletal: Normal range of motion  Trachea: No tracheal deviation  Cardiovascular:      Rate and Rhythm: Normal rate and regular rhythm  Heart sounds: Normal heart sounds  No murmur  No friction rub  No gallop  Pulmonary:      Effort: Pulmonary effort is normal  No respiratory distress  Breath sounds: Normal breath sounds  No stridor  No wheezing or rales  Abdominal:      General: There is no distension  Palpations: Abdomen is soft  There is no mass  Tenderness: There is no abdominal tenderness  There is no guarding or rebound  Genitourinary:     Vagina: Bleeding present  Cervix: Cervical bleeding present  No friability or erythema  Musculoskeletal: Normal range of motion  General: No tenderness or deformity  Skin:     General: Skin is warm and dry  Coloration: Skin is not pale  Findings: No erythema or rash  Neurological:      Mental Status: She is alert and oriented to person, place, and time        Coordination: Coordination normal    Psychiatric:         Mood and Affect: Mood normal          Behavior: Behavior normal          Lab Results:   Recent Results (from the past 24 hour(s))   UA (URINE) with reflex to Scope    Collection Time: 01/01/21  7:00 PM   Result Value Ref Range    Color, UA Yellow     Clarity, UA Clear     Specific Bessie, UA 1 015 1 003 - 1 030    pH, UA 8 0 4 5, 5 0, 5 5, 6 0, 6 5, 7 0, 7 5, 8 0    Leukocytes, UA Small (A) Negative    Nitrite, UA Negative Negative    Protein, UA Negative Negative mg/dl    Glucose, UA Negative Negative mg/dl    Ketones, UA Negative Negative mg/dl    Urobilinogen, UA 0 2 0 2, 1 0 E U /dl E U /dl    Bilirubin, UA Negative Negative    Blood, UA Large (A) Negative   Urine Microscopic    Collection Time: 01/01/21  7:00 PM   Result Value Ref Range    RBC, UA 2-4 None Seen, 0-1, 1-2, 2-4, 0-5 /hpf    WBC, UA 4-10 (A) None Seen, 0-1, 1-2, 0-5, 2-4 /hpf    Epithelial Cells Occasional None Seen, Occasional /hpf    Bacteria, UA Occasional None Seen, Occasional /hpf   POCT pregnancy, urine    Collection Time: 01/01/21  7:04 PM   Result Value Ref Range    EXT PREG TEST UR (Ref: Negative) negative     Control valid    CBC and differential    Collection Time: 01/01/21  7:16 PM   Result Value Ref Range    WBC 7 65 4 31 - 10 16 Thousand/uL    RBC 4 32 3 81 - 5 12 Million/uL    Hemoglobin 13 1 11 5 - 15 4 g/dL    Hematocrit 39 6 34 8 - 46 1 %    MCV 92 82 - 98 fL    MCH 30 3 26 8 - 34 3 pg MCHC 33 1 31 4 - 37 4 g/dL    RDW 13 1 11 6 - 15 1 %    MPV 10 3 8 9 - 12 7 fL    Platelets 738 231 - 750 Thousands/uL    nRBC 0 /100 WBCs    Neutrophils Relative 65 43 - 75 %    Immat GRANS % 0 0 - 2 %    Lymphocytes Relative 28 14 - 44 %    Monocytes Relative 6 4 - 12 %    Eosinophils Relative 1 0 - 6 %    Basophils Relative 0 0 - 1 %    Neutrophils Absolute 4 91 1 85 - 7 62 Thousands/µL    Immature Grans Absolute 0 02 0 00 - 0 20 Thousand/uL    Lymphocytes Absolute 2 14 0 60 - 4 47 Thousands/µL    Monocytes Absolute 0 48 0 17 - 1 22 Thousand/µL    Eosinophils Absolute 0 08 0 00 - 0 61 Thousand/µL    Basophils Absolute 0 02 0 00 - 0 10 Thousands/µL   Basic metabolic panel    Collection Time: 01/01/21  7:16 PM   Result Value Ref Range    Sodium 139 136 - 145 mmol/L    Potassium 3 5 3 5 - 5 3 mmol/L    Chloride 102 100 - 108 mmol/L    CO2 26 21 - 32 mmol/L    ANION GAP 11 4 - 13 mmol/L    BUN 12 5 - 25 mg/dL    Creatinine 0 84 0 60 - 1 30 mg/dL    Glucose 79 65 - 140 mg/dL    Calcium 8 9 8 3 - 10 1 mg/dL    eGFR 96 ml/min/1 73sq m       Imaging:  Pelvic U/S   Imaging Studies: I have personally reviewed pertinent reports  and I have personally reviewed pertinent films in PACS      Assessment/Plan     Assessment:  32 yo with malpositioned Mirena IUD  Plan:  Removal  Depo Provera bridge  F/u at [facility] for replacement    Code Status: No Order  Advance Directive and Living Will:      Power of :    POLST:      Counseling / Coordination of Care  Total floor / unit time spent today20 minutes  minutes  Greater than 50% of total time was spent with the patient and / or family counseling and / or coordination of care   A description of the counseling / coordination of care: counseling on effective contraception and follow up    Meredith Lay MD  1/1/2021  10:00 PM

## 2021-01-02 NOTE — ED NOTES
Medroxyprogesterone requested from [facility], [facility]  01/01/21 7624

## 2021-01-02 NOTE — ED PROVIDER NOTES
Emergency Department Sign Out Note        Sign out and transfer of care from [facility] ED  See Separate Emergency Department note  The patient, Jon Loaiza, was evaluated by the previous provider for       Workup Completed:  Labs, Ultrasound    ED Course / Workup Pending (followup):  OBGYN Consult--Dr Graciela Lyn/PGY III GYN Resident states she will discuss with patient and form dispo/plan for follow up +/- removal    Labs Reviewed   URINALYSIS WITH REFLEX TO SCOPE - Abnormal       Result Value Ref Range Status    Color, UA Yellow   Final    Clarity, UA Clear   Final    Specific [place], UA 1 015  1 003 - 1 030 Final    pH, UA 8 0  4 5, 5 0, 5 5, 6 0, 6 5, 7 0, 7 5, 8 0 Final    Leukocytes, UA Small (*) Negative Final    Nitrite, UA Negative  Negative Final    Protein, UA Negative  Negative mg/dl Final    Glucose, UA Negative  Negative mg/dl Final    Ketones, UA Negative  Negative mg/dl Final    Urobilinogen, UA 0 2  0 2, 1 0 E U /dl E U /dl Final    Bilirubin, UA Negative  Negative Final    Blood, UA Large (*) Negative Final   URINE MICROSCOPIC - Abnormal    RBC, UA 2-4  None Seen, 0-1, 1-2, 2-4, 0-5 /hpf Final    WBC, UA 4-10 (*) None Seen, 0-1, 1-2, 0-5, 2-4 /hpf Final    Epithelial Cells Occasional  None Seen, Occasional /hpf Final    Bacteria, UA Occasional  None Seen, Occasional /hpf Final   POCT PREGNANCY, URINE - Normal    EXT PREG TEST UR (Ref: Negative) negative   Final    Control valid   Final   CHLAMYDIA /GC AMPLIFIED DNA   CBC AND DIFFERENTIAL    WBC 7 65  4 31 - 10 16 Thousand/uL Final    RBC 4 32  3 81 - 5 12 Million/uL Final    Hemoglobin 13 1  11 5 - 15 4 g/dL Final    Hematocrit 39 6  34 8 - 46 1 % Final    MCV 92  82 - 98 fL Final    MCH 30 3  26 8 - 34 3 pg Final    MCHC 33 1  31 4 - 37 4 g/dL Final    RDW 13 1  11 6 - 15 1 % Final    MPV 10 3  8 9 - 12 7 fL Final    Platelets 539  210 - 390 Thousands/uL Final    nRBC 0  /100 WBCs Final    Neutrophils Relative 65  43 - 75 % Final    Immat GRANS % 0  0 - 2 % Final    Lymphocytes Relative 28  14 - 44 % Final    Monocytes Relative 6  4 - 12 % Final    Eosinophils Relative 1  0 - 6 % Final    Basophils Relative 0  0 - 1 % Final    Neutrophils Absolute 4 91  1 85 - 7 62 Thousands/µL Final    Immature Grans Absolute 0 02  0 00 - 0 20 Thousand/uL Final    Lymphocytes Absolute 2 14  0 60 - 4 47 Thousands/µL Final    Monocytes Absolute 0 48  0 17 - 1 22 Thousand/µL Final    Eosinophils Absolute 0 08  0 00 - 0 61 Thousand/µL Final    Basophils Absolute 0 02  0 00 - 0 10 Thousands/µL Final   BASIC METABOLIC PANEL    Sodium 425  136 - 145 mmol/L Final    Potassium 3 5  3 5 - 5 3 mmol/L Final    Chloride 102  100 - 108 mmol/L Final    CO2 26  21 - 32 mmol/L Final    ANION GAP 11  4 - 13 mmol/L Final    BUN 12  5 - 25 mg/dL Final    Creatinine 0 84  0 60 - 1 30 mg/dL Final    Comment: Standardized to IDMS reference method    Glucose 79  65 - 140 mg/dL Final    Comment: If the patient is fasting, the ADA then defines impaired fasting glucose as > 100 mg/dL and diabetes as > or equal to 123 mg/dL  Specimen collection should occur prior to Sulfasalazine administration due to the potential for falsely depressed results  Specimen collection should occur prior to Sulfapyridine administration due to the potential for falsely elevated results      Calcium 8 9  8 3 - 10 1 mg/dL Final    eGFR 96  ml/min/1 73sq m Final    Narrative:     [place] guidelines for Chronic Kidney Disease (CKD):     Stage 1 with normal or high GFR (GFR > 90 mL/min/1 73 square meters)    Stage 2 Mild CKD (GFR = 60-89 mL/min/1 73 square meters)    Stage 3A Moderate CKD (GFR = 45-59 mL/min/1 73 square meters)    Stage 3B Moderate CKD (GFR = 30-44 mL/min/1 73 square meters)    Stage 4 Severe CKD (GFR = 15-29 mL/min/1 73 square meters)    Stage 5 End Stage CKD (GFR <15 mL/min/1 73 square meters)  Note: GFR calculation is accurate only with a steady state creatinine       US pelvis complete w transvaginal   ED Interpretation   Low position of an IUD extending into the endocervix  Final Result      Low position of an IUD extending into the endocervix  The study was marked in [facility] for immediate notification  Workstation performed: [id number]                                                Procedures  MDM  Number of Diagnoses or Management Options  IUD migration, initial encounter: new and requires workup  Pelvic pain: new and requires workup     Amount and/or Complexity of Data Reviewed  Clinical lab tests: reviewed  Tests in the radiology section of CPT®: reviewed  Tests in the medicine section of CPT®: reviewed  Review and summarize past medical records: yes  Discuss the patient with other providers: yes  Independent visualization of images, tracings, or specimens: yes    Risk of Complications, Morbidity, and/or Mortality  Presenting problems: moderate  Diagnostic procedures: moderate  Management options: moderate    Patient Progress  Patient progress: stable      Disposition  Final diagnoses:   Pelvic pain   IUD migration, initial encounter     Time reflects when diagnosis was documented in both MDM as applicable and the Disposition within this note     Time User Action Codes Description Comment    1/1/2021  8:43 PM Leeanne Morgan [R10 2] Pelvic pain     1/1/2021  8:43 PM Leeanne Roberts IUD migration, initial encounter       ED Disposition     ED Disposition Condition Date/Time Comment    Discharge Stable Fri Jan 1, 2021 10:27 PM Humble Hernandez discharge to home/self care  Follow-up Information     Follow up With Specialties Details Why Contact Info    your Gynecologist        [facility] Schedule an appointment as soon as possible for a visit  [address] [facility] [address] [facility]  [phone]          There are no discharge medications for this patient      No discharge procedures on file         ED Provider  Electronically Signed by     Luisa Lozano PA-C  01/02/21 0439

## 2021-01-02 NOTE — DISCHARGE INSTRUCTIONS
Medroxyprogesterone (By injection)   Medroxyprogesterone (uf-zjaa-lj-proe-ASHLEIGH-ter-one)  Prevents pregnancy  Also treats endometriosis and is used with other medicines to help relieve symptoms of cancer, including uterine or kidney cancer  Brand Name(s): Depo-Provera, Depo-Provera Contraceptive, Depo-SubQ Provera 104, medroxyPROGESTERone acetate Novaplus   There may be other brand names for this medicine  When This Medicine Should Not Be Used: This medicine is not right for everyone  You should not receive it if you had an allergic reaction to medroxyprogesterone or if you have a history of breast cancer or blood clots (including heart attack or stroke)  In most cases, you should not use this medicine while you are pregnant  How to Use This Medicine:   Injectable  · A nurse or other health provider will give you this medicine  This medicine is given as a shot into a muscle or just under the skin  · Your exact treatment schedule depends on the reason you are using this medicine  You doctor will explain your personal schedule  ? For treatment of cancer symptoms, you may start with a shot once per week  You may need fewer shots as your treatment goes forward  ? For birth control or endometriosis, you will need a shot every 3 months (13 weeks)  ? You might need to have the first shot during the first 5 days of your normal menstrual period, to make sure you are not pregnant  If you have just had a baby, you may receive a shot 5 days after birth if you are not breastfeeding or 6 weeks after birth if you are breastfeeding  · Read and follow the patient instructions that come with this medicine  Talk to your doctor or pharmacist if you have any questions  · Missed dose: You must receive a shot every 3 months if you want to prevent pregnancy  Talk to your doctor or pharmacist if you do not receive your medicine on time, because you may need another form of birth control    Drugs and Foods to Avoid:   Ask your doctor or pharmacist before using any other medicine, including over-the-counter medicines, vitamins, and herbal products  · Some medicines can affect how medroxyprogesterone works  Tell your doctor if you are using any of the following:  ? Aminoglutethimide, bosentan, carbamazepine, felbamate, griseofulvin, mitotane, modafinil, nefazodone, oxcarbazepine, phenobarbital, phenytoin, rifabutin, rifampin, rifapentine, Drew's wort, topiramate  ? Medicine to treat an infection (including clarithromycin, itraconazole, ketoconazole, telithromycin, voriconazole)  ? Medicine to treat HIV/AIDS (including atazanavir, efavirenz, indinavir, nelfinavir, ritonavir, saquinavir)  Warnings While Using This Medicine:   · Tell your doctor right away if you think you have become pregnant  · Tell your doctor if you are breastfeeding, or if you have kidney disease, liver disease, asthma, diabetes, heart disease, seizures, migraine headaches, an eating disorder, osteoporosis, or a history of depression  Tell your doctor if you smoke  · This medicine may cause the following problems:  ? Blood clots, which could lead to stroke, heart attack, or other serious problems  ? Possible increased risk of breast cancer  ? Weak or thin bones, especially with long-term use  · You should not use this medicine for long-term birth control unless you cannot use any other form of birth control  · This medicine will not protect you from HIV/AIDS or other sexually transmitted diseases  · Tell any doctor or dentist who treats you that you are using this medicine  This medicine may affect certain medical test results  · Your doctor will check your progress and the effects of this medicine at regular visits  Keep all appointments    Possible Side Effects While Using This Medicine:   Call your doctor right away if you notice any of these side effects:  · Allergic reaction: Itching or hives, swelling in your face or hands, swelling or tingling in your mouth or throat, chest tightness, trouble breathing  · Chest pain, trouble breathing, or coughing up blood  · Dark urine or pale stools, nausea, vomiting, loss of appetite, stomach pain, yellow skin or eyes  · Heavy or nonstop vaginal bleeding  · Loss of vision, double vision  · Numbness or weakness on one side of your body, sudden or severe headache, problems with vision, speech, or walking  · Severe stomach pain or cramps  If you notice these less serious side effects, talk with your doctor:   · Headache  · Light or missed monthly periods, spotting between periods  · Nervousness or dizziness  · Pain, redness, burning, swelling, or a lump under your skin where the shot was given  · Weight gain  If you notice other side effects that you think are caused by this medicine, tell your doctor  Call your doctor for medical advice about side effects  You may report side effects to FDA at 5-631-FDA-8687  © Copyright [employer] Information is for End User's use only and may not be sold, redistributed or otherwise used for commercial purposes  The above information is an  only  It is not intended as medical advice for individual conditions or treatments  Talk to your doctor, nurse or pharmacist before following any medical regimen to see if it is safe and effective for you

## 2021-01-04 ENCOUNTER — TELEPHONE (OUTPATIENT)
Dept: OBGYN CLINIC | Facility: CLINIC | Age: 26
End: 2021-01-04

## 2021-01-04 NOTE — TELEPHONE ENCOUNTER
----- Message from Sheila Collazo MD sent at 1/4/2021  8:21 AM EST -----  Regarding: IUD  Hi - this pt was seen in the ED over the weekend and had malpositioned IUD removed  She would like another mirena  Can be placed anytime as she received depo-provera in the ED  Thank you!

## 2021-01-04 NOTE — TELEPHONE ENCOUNTER
----- Message from Jimy Eaton sent at 1/4/2021  7:33 AM EST -----  Regarding: FW: Test Results Question  Contact: [phone]  Please contact this patient and let her know that meds were sent by one of the providers in the ED:    Ponce Steele PA-C wrote: "Patient returned call to the emergency department  Name and date of birth use as positive patient identifiers  Made aware of test result which was positive for chlamydia  Prescription for doxycycline 100 mg PO BID  For 7 days called in to [facility] on [address] [facility]"    Please advise patient that partner will also need to be treated and they should abstain from sexual contact for 2 weeks after they have both been treated  Please schedule her for a ALMA visit with me in 2-3 weeks     Thanks   ----- Message -----  From: Keith Gutierrez RN  Sent: 1/3/2021   5:33 PM EST  To: LEANN Eaton  Subject: FW: Test Results Question                          ----- Message -----  From: Abeba Madsen  Sent: 1/2/2021   9:18 AM EST  To: [facility]  Subject: Test Results Question                            Hello,  On 1/1/21 I tested positive for chlamydia when I was at the ER, I just got the test result this morning  What kind of medication do I need to get rid of this?     Thank you,  Emerita Hsu

## 2021-02-03 ENCOUNTER — OFFICE VISIT (OUTPATIENT)
Dept: OBGYN CLINIC | Facility: CLINIC | Age: 26
End: 2021-02-03
Payer: COMMERCIAL

## 2021-02-03 VITALS — BODY MASS INDEX: 22.03 KG/M2 | SYSTOLIC BLOOD PRESSURE: 118 MMHG | WEIGHT: 116.6 LBS | DIASTOLIC BLOOD PRESSURE: 88 MMHG

## 2021-02-03 DIAGNOSIS — A74.9 CHLAMYDIA: Primary | ICD-10-CM

## 2021-02-03 DIAGNOSIS — Z30.42 ENCOUNTER FOR SURVEILLANCE OF INJECTABLE CONTRACEPTIVE: ICD-10-CM

## 2021-02-03 PROCEDURE — 87110 CHLAMYDIA CULTURE: CPT | Performed by: NURSE PRACTITIONER

## 2021-02-03 PROCEDURE — 99213 OFFICE O/P EST LOW 20 MIN: CPT | Performed by: NURSE PRACTITIONER

## 2021-02-03 RX ORDER — BUSPIRONE HYDROCHLORIDE 7.5 MG/1
7.5 TABLET ORAL 2 TIMES DAILY
COMMUNITY
Start: 2020-12-20

## 2021-02-03 RX ORDER — MEDROXYPROGESTERONE ACETATE 150 MG/ML
150 INJECTION, SUSPENSION INTRAMUSCULAR
Qty: 1 ML | Refills: 2 | Status: SHIPPED | OUTPATIENT
Start: 2021-02-03

## 2021-02-03 RX ORDER — BUPROPION HYDROCHLORIDE 150 MG/1
450 TABLET ORAL DAILY
COMMUNITY
Start: 2021-01-18

## 2021-02-03 RX ORDER — ALPRAZOLAM 0.25 MG/1
0.25 TABLET ORAL DAILY PRN
COMMUNITY
Start: 2020-11-11

## 2021-02-03 NOTE — ASSESSMENT & PLAN NOTE
She and partner were treated for chlamydia with doxy x 7 days  ALMA obtained today via vaginal swab  No symptoms  Aware will call her with results

## 2021-02-03 NOTE — PROGRESS NOTES
Assessment/Plan:    Encounter for surveillance of injectable contraceptive  Desires to continue depoprovera  Aware of RTO date and rx sent  No bleeding  Due for annual with next injection  Chlamydia  She and partner were treated for chlamydia with doxy x 7 days  ALMA obtained today via vaginal swab  No symptoms  Aware will call her with results  Diagnoses and all orders for this visit:    Chlamydia  -     Chlamydia trachomatis culture    Encounter for surveillance of injectable contraceptive  -     medroxyPROGESTERone (DEPO-PROVERA) 150 mg/mL injection; Inject 1 mL (150 mg total) into a muscle every 3 (three) months    Other orders  -     buPROPion (WELLBUTRIN XL) 150 mg 24 hr tablet; Take 450 mg by mouth daily  -     busPIRone (BUSPAR) 7 5 mg tablet; Take 7 5 mg by mouth 2 (two) times a day  -     ALPRAZolam (XANAX) 0 25 mg tablet; Take 0 25 mg by mouth daily as needed        Subjective:      Patient ID: Kat Paul is a 31 y o  female  Kimberly Allen is a 31year old who presents for ALMA for + chlamydia infection  She was treated for chlamydia the beginning of January with Doxycycline  Her partner was also treated  She was seen in ED for pain 1/1/21 and her IUD was found to be in cervix and removed  She received depo as a bridge until another Mirena could be inserted but she has now decided to stay with depoprovera  The following portions of the patient's history were reviewed and updated as appropriate: allergies, current medications, past family history, past medical history, past social history, past surgical history and problem list     Review of Systems   Constitutional: Negative for chills, fatigue and fever  HENT: Negative for congestion, sneezing and sore throat  Respiratory: Negative for cough and shortness of breath  Gastrointestinal: Negative for abdominal pain     Genitourinary: Negative for decreased urine volume, difficulty urinating, dyspareunia, menstrual problem, pelvic pain, urgency and vaginal discharge  Skin: Negative  Psychiatric/Behavioral: Negative  Objective:      /88 (BP Location: Right arm, Patient Position: Sitting, Cuff Size: Standard)   Wt 52 9 kg (116 lb 9 6 oz)   LMP 12/30/2020   BMI 22 03 kg/m²          Physical Exam  Genitourinary:     Labia:         Right: No rash, tenderness or lesion  Left: No rash, tenderness or lesion  Vagina: No vaginal discharge, erythema, tenderness or bleeding  Cervix: No cervical motion tenderness, discharge, friability, lesion, erythema or cervical bleeding  Musculoskeletal: Normal range of motion  Skin:     General: Skin is warm and dry  Capillary Refill: Capillary refill takes less than 2 seconds  Neurological:      Mental Status: She is oriented to person, place, and time     Psychiatric:         Mood and Affect: Mood normal

## 2021-02-03 NOTE — ASSESSMENT & PLAN NOTE
Desires to continue depoprovera  Aware of RTO date and rx sent  No bleeding  Due for annual with next injection

## 2021-02-08 LAB — C TRACH SPEC QL CULT: NEGATIVE

## 2021-02-09 ENCOUNTER — TELEPHONE (OUTPATIENT)
Dept: OBGYN CLINIC | Facility: MEDICAL CENTER | Age: 26
End: 2021-02-09

## 2021-02-11 ENCOUNTER — TELEPHONE (OUTPATIENT)
Dept: OBGYN CLINIC | Facility: CLINIC | Age: 26
End: 2021-02-11

## 2021-03-23 ENCOUNTER — TELEPHONE (OUTPATIENT)
Dept: OBGYN CLINIC | Facility: MEDICAL CENTER | Age: 26
End: 2021-03-23

## 2021-03-25 ENCOUNTER — CLINICAL SUPPORT (OUTPATIENT)
Dept: OBGYN CLINIC | Facility: CLINIC | Age: 26
End: 2021-03-25

## 2021-03-25 VITALS — DIASTOLIC BLOOD PRESSURE: 72 MMHG | WEIGHT: 115.2 LBS | SYSTOLIC BLOOD PRESSURE: 110 MMHG | BODY MASS INDEX: 21.77 KG/M2

## 2021-03-25 DIAGNOSIS — Z30.42 ENCOUNTER FOR MANAGEMENT AND INJECTION OF DEPO-PROVERA: Primary | ICD-10-CM

## 2021-03-25 PROCEDURE — 96372 THER/PROPH/DIAG INJ SC/IM: CPT | Performed by: STUDENT IN AN ORGANIZED HEALTH CARE EDUCATION/TRAINING PROGRAM

## 2021-03-25 RX ORDER — MEDROXYPROGESTERONE ACETATE 150 MG/ML
150 INJECTION, SUSPENSION INTRAMUSCULAR ONCE
Status: COMPLETED | OUTPATIENT
Start: 2021-03-25 | End: 2021-03-25

## 2021-03-25 RX ADMIN — MEDROXYPROGESTERONE ACETATE 150 MG: 150 INJECTION, SUSPENSION INTRAMUSCULAR at 10:35

## 2021-03-25 NOTE — PROGRESS NOTES
Depo  Last Depo: 1/1/21 in ED  Last Yearly:7/31/18  Last Pap:7/31/18  Serum HCG Indicated:No  LMP/Spotting/Bleeding on Depo: Spotting began 3/24/21  Injection Site Today: Left Deltoid  How did patient tolerate: Yes  1st Depo? Wait time 10-15mins  :N/A  Next Depo Time Frame: 6/10-6/24  Scheduled 6/3 at 11w due to spotting   Pt  Has tubes tied uses it only to stop menses  Yearly Scheduled?: Yes  Given By:Monica BRIGHT MA  ISQ#:[id number]  EXP:06/2023  NDC: [address]-4  Manufacture: Yuri Gill

## 2021-05-18 RX ORDER — METRONIDAZOLE 500 MG/1
TABLET ORAL
Qty: 14 TABLET | Refills: 0 | OUTPATIENT
Start: 2021-05-18

## 2021-06-14 ENCOUNTER — ANNUAL EXAM (OUTPATIENT)
Dept: OBGYN CLINIC | Facility: CLINIC | Age: 26
End: 2021-06-14
Payer: COMMERCIAL

## 2021-06-14 VITALS — WEIGHT: 118 LBS | BODY MASS INDEX: 22.3 KG/M2 | DIASTOLIC BLOOD PRESSURE: 72 MMHG | SYSTOLIC BLOOD PRESSURE: 112 MMHG

## 2021-06-14 DIAGNOSIS — Z12.39 ENCOUNTER FOR SCREENING BREAST EXAMINATION: ICD-10-CM

## 2021-06-14 DIAGNOSIS — Z01.419 ENCOUNTER FOR WELL WOMAN EXAM: Primary | ICD-10-CM

## 2021-06-14 DIAGNOSIS — Z30.42 SURVEILLANCE FOR INJECTABLE MEDROXYPROGESTERONE/ESTRADIOL: ICD-10-CM

## 2021-06-14 PROCEDURE — S0612 ANNUAL GYNECOLOGICAL EXAMINA: HCPCS | Performed by: PHYSICIAN ASSISTANT

## 2021-06-14 RX ORDER — TRAZODONE HYDROCHLORIDE 50 MG/1
50 TABLET ORAL
COMMUNITY

## 2021-06-14 RX ORDER — MEDROXYPROGESTERONE ACETATE 150 MG/ML
150 INJECTION, SUSPENSION INTRAMUSCULAR
Qty: 1 ML | Refills: 4 | Status: SHIPPED | OUTPATIENT
Start: 2021-06-14 | End: 2022-07-12

## 2021-06-14 NOTE — PROGRESS NOTES
Patient is here for yearly exam  Patient is currently on Depo, has had 2 injections so far  Coming in tomorrow for her 3rd depo injection  Patient has been getting spotting around when she is due for her injection  Patient has no breast concerns and B&B ok  Patient is not due for a pap smear at this visit  2/3/21 Negaitve Chlamydia  1/1/21 +Chlamydia  7/31/18 Normal Pap, Negaitve GC/Chlmaydia

## 2021-06-14 NOTE — PROGRESS NOTES
Assessment/Plan:    No problem-specific Assessment & Plan notes found for this encounter  Diagnoses and all orders for this visit:    Encounter for well woman exam    Encounter for screening breast examination    Surveillance for injectable medroxyprogesterone/estradiol  -     medroxyPROGESTERone (DEPO-PROVERA) 150 mg/mL injection; Inject 1 mL (150 mg total) into a muscle every 3 (three) months    Other orders  -     traZODone (DESYREL) 50 mg tablet; Take 50 mg by mouth daily at bedtime          Subjective:      Patient ID: Blanca Jimenez is a 31 y o  female  Pt presents as a new patient for her annual exam today--  She has no complaints  No changes  She has no bleeding or pelvic pain--on depo  occ spots when due for next shot  Bowel and bladder are regular  No breast concerns today      No pap today  rx depo  Daily mvi--cont      The following portions of the patient's history were reviewed and updated as appropriate: allergies, current medications, past family history, past medical history, past social history, past surgical history and problem list     Review of Systems   Constitutional: Negative for chills, fever and unexpected weight change  Gastrointestinal: Negative for abdominal pain, blood in stool, constipation and diarrhea  Genitourinary: Negative  Objective:      /72   Wt 53 5 kg (118 lb)   Breastfeeding No   BMI 22 30 kg/m²          Physical Exam  Vitals and nursing note reviewed  Constitutional:       Appearance: She is well-developed  HENT:      Head: Normocephalic and atraumatic  Chest:      Breasts:         Right: No inverted nipple, mass, nipple discharge or skin change  Left: No inverted nipple, mass, nipple discharge or skin change  Abdominal:      Palpations: Abdomen is soft  Genitourinary:     Exam position: Supine  Labia:         Right: No rash, tenderness or lesion  Left: No rash, tenderness or lesion         Vagina: Normal  Cervix: No cervical motion tenderness, discharge or friability  Adnexa:         Right: No mass, tenderness or fullness  Left: No mass, tenderness or fullness  Musculoskeletal:      Cervical back: Normal range of motion  Lymphadenopathy:      Lower Body: No right inguinal adenopathy  No left inguinal adenopathy

## 2021-06-15 ENCOUNTER — OFFICE VISIT (OUTPATIENT)
Dept: OBGYN CLINIC | Facility: CLINIC | Age: 26
End: 2021-06-15
Payer: COMMERCIAL

## 2021-06-15 VITALS — DIASTOLIC BLOOD PRESSURE: 66 MMHG | SYSTOLIC BLOOD PRESSURE: 108 MMHG

## 2021-06-15 DIAGNOSIS — Z30.42 SURVEILLANCE FOR INJECTABLE MEDROXYPROGESTERONE/ESTRADIOL: Primary | ICD-10-CM

## 2021-06-15 PROCEDURE — 96372 THER/PROPH/DIAG INJ SC/IM: CPT

## 2021-06-15 RX ORDER — MEDROXYPROGESTERONE ACETATE 150 MG/ML
150 INJECTION, SUSPENSION INTRAMUSCULAR ONCE
Status: COMPLETED | OUTPATIENT
Start: 2021-06-15 | End: 2021-06-15

## 2021-06-15 RX ADMIN — MEDROXYPROGESTERONE ACETATE 150 MG: 150 INJECTION, SUSPENSION INTRAMUSCULAR at 15:20

## 2021-06-15 NOTE — PROGRESS NOTES
Patient is here for Depo injection in RIGHT DELT  Patient is doing well on depo and would like to continue  Patient tolerated injection well        LOT #:[id number]  [facility]  SDL:[id number]

## 2021-09-01 ENCOUNTER — OFFICE VISIT (OUTPATIENT)
Dept: OBGYN CLINIC | Facility: CLINIC | Age: 26
End: 2021-09-01
Payer: COMMERCIAL

## 2021-09-01 VITALS — SYSTOLIC BLOOD PRESSURE: 110 MMHG | DIASTOLIC BLOOD PRESSURE: 74 MMHG

## 2021-09-01 DIAGNOSIS — Z30.42 SURVEILLANCE FOR INJECTABLE MEDROXYPROGESTERONE/ESTRADIOL: Primary | ICD-10-CM

## 2021-09-01 PROCEDURE — 96372 THER/PROPH/DIAG INJ SC/IM: CPT

## 2021-09-01 RX ORDER — MEDROXYPROGESTERONE ACETATE 150 MG/ML
150 INJECTION, SUSPENSION INTRAMUSCULAR ONCE
Status: COMPLETED | OUTPATIENT
Start: 2021-09-01 | End: 2021-09-01

## 2021-09-01 RX ADMIN — MEDROXYPROGESTERONE ACETATE 150 MG: 150 INJECTION, SUSPENSION INTRAMUSCULAR at 16:19

## 2021-09-01 NOTE — PROGRESS NOTES
Patient is here for Depo injection in LEFT DELT  Patient is doing well on Depo and would like to continue  Patient did have some bleed about one week ago  Patient tolerated injection well      LOT #:[id number]  EXP:3/31/2025  [facility]:[id number]

## 2021-11-17 ENCOUNTER — OFFICE VISIT (OUTPATIENT)
Dept: OBGYN CLINIC | Facility: CLINIC | Age: 26
End: 2021-11-17
Payer: COMMERCIAL

## 2021-11-17 VITALS — SYSTOLIC BLOOD PRESSURE: 120 MMHG | DIASTOLIC BLOOD PRESSURE: 80 MMHG

## 2021-11-17 DIAGNOSIS — Z30.42 ENCOUNTER FOR SURVEILLANCE OF INJECTABLE CONTRACEPTIVE: Primary | ICD-10-CM

## 2021-11-17 PROCEDURE — 96372 THER/PROPH/DIAG INJ SC/IM: CPT

## 2021-11-17 RX ORDER — MEDROXYPROGESTERONE ACETATE 150 MG/ML
150 INJECTION, SUSPENSION INTRAMUSCULAR ONCE
Status: COMPLETED | OUTPATIENT
Start: 2021-11-17 | End: 2021-11-17

## 2021-11-17 RX ADMIN — MEDROXYPROGESTERONE ACETATE 150 MG: 150 INJECTION, SUSPENSION INTRAMUSCULAR at 14:30

## 2022-02-02 ENCOUNTER — OFFICE VISIT (OUTPATIENT)
Dept: OBGYN CLINIC | Facility: CLINIC | Age: 27
End: 2022-02-02
Payer: COMMERCIAL

## 2022-02-02 VITALS — SYSTOLIC BLOOD PRESSURE: 122 MMHG | DIASTOLIC BLOOD PRESSURE: 70 MMHG

## 2022-02-02 DIAGNOSIS — Z30.42 ENCOUNTER FOR SURVEILLANCE OF INJECTABLE CONTRACEPTIVE: Primary | ICD-10-CM

## 2022-02-02 PROCEDURE — 96372 THER/PROPH/DIAG INJ SC/IM: CPT

## 2022-02-02 RX ADMIN — MEDROXYPROGESTERONE ACETATE 150 MG: 150 INJECTION, SUSPENSION INTRAMUSCULAR at 14:55

## 2022-02-02 NOTE — PROGRESS NOTES
The patient is here for a depo injection in the LEFT DELT  The patient tolerated the injection well  The patient started having spotting last week  No pelvic pain  The patient has no other concerns       LOT: [id number]  EXP: 5/31/2026  NDC: [address]-97

## 2022-02-04 RX ORDER — MEDROXYPROGESTERONE ACETATE 150 MG/ML
150 INJECTION, SUSPENSION INTRAMUSCULAR ONCE
Status: COMPLETED | OUTPATIENT
Start: 2022-02-04 | End: 2022-02-02

## 2022-04-20 ENCOUNTER — OFFICE VISIT (OUTPATIENT)
Dept: OBGYN CLINIC | Facility: CLINIC | Age: 27
End: 2022-04-20
Payer: COMMERCIAL

## 2022-04-20 VITALS — SYSTOLIC BLOOD PRESSURE: 124 MMHG | DIASTOLIC BLOOD PRESSURE: 80 MMHG

## 2022-04-20 DIAGNOSIS — Z30.42 ENCOUNTER FOR SURVEILLANCE OF INJECTABLE CONTRACEPTIVE: Primary | ICD-10-CM

## 2022-04-20 PROCEDURE — 96372 THER/PROPH/DIAG INJ SC/IM: CPT

## 2022-04-20 RX ADMIN — MEDROXYPROGESTERONE ACETATE 150 MG: 150 INJECTION, SUSPENSION INTRAMUSCULAR at 13:55

## 2022-04-20 NOTE — PROGRESS NOTES
The patient is here for depo injection in the RIGHT DELT  No bleeding or cramping  The patient has no concerns  The patient tolerated the injection well

## 2022-04-22 RX ORDER — MEDROXYPROGESTERONE ACETATE 150 MG/ML
150 INJECTION, SUSPENSION INTRAMUSCULAR ONCE
Status: COMPLETED | OUTPATIENT
Start: 2022-04-22 | End: 2022-04-20

## 2022-06-15 ENCOUNTER — ANNUAL EXAM (OUTPATIENT)
Dept: OBGYN CLINIC | Facility: CLINIC | Age: 27
End: 2022-06-15
Payer: COMMERCIAL

## 2022-06-15 VITALS
BODY MASS INDEX: 23.6 KG/M2 | HEIGHT: 61 IN | DIASTOLIC BLOOD PRESSURE: 80 MMHG | SYSTOLIC BLOOD PRESSURE: 128 MMHG | WEIGHT: 125 LBS

## 2022-06-15 DIAGNOSIS — Z12.4 CERVICAL CANCER SCREENING: ICD-10-CM

## 2022-06-15 DIAGNOSIS — Z30.42 SURVEILLANCE OF CONTRACEPTIVE INJECTION: ICD-10-CM

## 2022-06-15 DIAGNOSIS — Z01.419 ENCOUNTER FOR WELL WOMAN EXAM: Primary | ICD-10-CM

## 2022-06-15 DIAGNOSIS — Z12.39 ENCOUNTER FOR SCREENING BREAST EXAMINATION: ICD-10-CM

## 2022-06-15 PROCEDURE — S0612 ANNUAL GYNECOLOGICAL EXAMINA: HCPCS | Performed by: PHYSICIAN ASSISTANT

## 2022-06-15 PROCEDURE — G0145 SCR C/V CYTO,THINLAYER,RESCR: HCPCS | Performed by: PHYSICIAN ASSISTANT

## 2022-06-15 RX ORDER — MEDROXYPROGESTERONE ACETATE 150 MG/ML
150 INJECTION, SUSPENSION INTRAMUSCULAR
Qty: 1 ML | Refills: 4 | Status: SHIPPED | OUTPATIENT
Start: 2022-06-15

## 2022-06-15 NOTE — PROGRESS NOTES
Assessment/Plan:    No problem-specific Assessment & Plan notes found for this encounter  Diagnoses and all orders for this visit:    Encounter for well woman exam    Encounter for screening breast examination    Cervical cancer screening  -     Liquid-based pap, screening    Surveillance of contraceptive injection  -     medroxyPROGESTERone (DEPO-PROVERA) 150 mg/mL injection; Inject 1 mL (150 mg total) into a muscle every 3 (three) months          Subjective:      Patient ID: Lyla Crigler is a 28 y o  female  Pt presents for her annual exam today--  She has no complaints  She has no bleeding or pelvic pain--on Depo  Bowel and bladder are regular  No breast concerns today       pap today  rx Depo 150mg  Daily mvi, probiotic      The following portions of the patient's history were reviewed and updated as appropriate: allergies, current medications, past family history, past medical history, past social history, past surgical history and problem list     Review of Systems   Constitutional: Negative for chills, fever and unexpected weight change  Gastrointestinal: Negative for abdominal pain, blood in stool, constipation and diarrhea  Genitourinary: Negative  Objective:      /80 (BP Location: Left arm, Patient Position: Sitting, Cuff Size: Standard)   Ht 5' 1" (1 549 m)   Wt 56 7 kg (125 lb)   BMI 23 62 kg/m²          Physical Exam  Vitals and nursing note reviewed  Constitutional:       Appearance: She is well-developed  HENT:      Head: Normocephalic and atraumatic  Chest:   Breasts:      Right: No inverted nipple, mass, nipple discharge or skin change  Left: No inverted nipple, mass, nipple discharge or skin change  Abdominal:      Palpations: Abdomen is soft  Genitourinary:     Exam position: Supine  Labia:         Right: No rash, tenderness or lesion  Left: No rash, tenderness or lesion         Vagina: Normal       Cervix: No cervical motion tenderness, discharge or friability  Adnexa:         Right: No mass, tenderness or fullness  Left: No mass, tenderness or fullness  Musculoskeletal:      Cervical back: Normal range of motion  Lymphadenopathy:      Lower Body: No right inguinal adenopathy  No left inguinal adenopathy

## 2022-06-23 LAB
LAB AP GYN PRIMARY INTERPRETATION: NORMAL
LAB AP LMP: NORMAL
Lab: NORMAL

## 2022-07-05 ENCOUNTER — OFFICE VISIT (OUTPATIENT)
Dept: OBGYN CLINIC | Facility: CLINIC | Age: 27
End: 2022-07-05
Payer: COMMERCIAL

## 2022-07-05 VITALS — SYSTOLIC BLOOD PRESSURE: 122 MMHG | DIASTOLIC BLOOD PRESSURE: 80 MMHG

## 2022-07-05 DIAGNOSIS — Z30.42 ENCOUNTER FOR SURVEILLANCE OF INJECTABLE CONTRACEPTIVE: Primary | ICD-10-CM

## 2022-07-05 PROCEDURE — 96372 THER/PROPH/DIAG INJ SC/IM: CPT

## 2022-07-05 RX ORDER — MEDROXYPROGESTERONE ACETATE 150 MG/ML
150 INJECTION, SUSPENSION INTRAMUSCULAR ONCE
Status: COMPLETED | OUTPATIENT
Start: 2022-07-05 | End: 2022-07-05

## 2022-07-05 RX ADMIN — MEDROXYPROGESTERONE ACETATE 150 MG: 150 INJECTION, SUSPENSION INTRAMUSCULAR at 14:35

## 2022-07-05 NOTE — PROGRESS NOTES
The patient is here for a depo injection in the LEFT DELT  No bleeding or cramping  The patient has no concerns  The patient tolerated the injection well

## 2022-09-20 ENCOUNTER — CLINICAL SUPPORT (OUTPATIENT)
Dept: GYNECOLOGY | Facility: CLINIC | Age: 27
End: 2022-09-20
Payer: COMMERCIAL

## 2022-09-20 VITALS — DIASTOLIC BLOOD PRESSURE: 80 MMHG | SYSTOLIC BLOOD PRESSURE: 120 MMHG

## 2022-09-20 DIAGNOSIS — Z30.42 ENCOUNTER FOR SURVEILLANCE OF INJECTABLE CONTRACEPTIVE: Primary | ICD-10-CM

## 2022-09-20 PROCEDURE — 96372 THER/PROPH/DIAG INJ SC/IM: CPT | Performed by: PHYSICIAN ASSISTANT

## 2022-09-20 RX ORDER — MEDROXYPROGESTERONE ACETATE 150 MG/ML
150 INJECTION, SUSPENSION INTRAMUSCULAR ONCE
Status: COMPLETED | OUTPATIENT
Start: 2022-09-20 | End: 2022-09-20

## 2022-09-20 RX ADMIN — MEDROXYPROGESTERONE ACETATE 150 MG: 150 INJECTION, SUSPENSION INTRAMUSCULAR at 14:00

## 2022-09-20 NOTE — PROGRESS NOTES
The patient is here for a depo injection in the RIGHT DELT  No bleeding or cramping  The patient has no concerns  The patient tolerated the injection well

## 2022-12-06 ENCOUNTER — CLINICAL SUPPORT (OUTPATIENT)
Dept: GYNECOLOGY | Facility: CLINIC | Age: 27
End: 2022-12-06

## 2022-12-06 VITALS — SYSTOLIC BLOOD PRESSURE: 120 MMHG | DIASTOLIC BLOOD PRESSURE: 82 MMHG

## 2022-12-06 DIAGNOSIS — Z30.42 ENCOUNTER FOR SURVEILLANCE OF INJECTABLE CONTRACEPTIVE: Primary | ICD-10-CM

## 2022-12-06 RX ORDER — MEDROXYPROGESTERONE ACETATE 150 MG/ML
150 INJECTION, SUSPENSION INTRAMUSCULAR ONCE
Status: COMPLETED | OUTPATIENT
Start: 2022-12-06 | End: 2022-12-06

## 2022-12-06 RX ADMIN — MEDROXYPROGESTERONE ACETATE 150 MG: 150 INJECTION, SUSPENSION INTRAMUSCULAR at 13:14

## 2022-12-06 NOTE — PROGRESS NOTES
The patient is here for a depo injection in the LEFT DELT  No bleeding or cramping  The patient has gained about 10-15 lbs since going on the depo  She also has been having a lower sex drive  The patient did say her migraines have improved while on the depo  The patient tolerated the injection well

## 2023-10-02 ENCOUNTER — TELEPHONE (OUTPATIENT)
Dept: NEUROLOGY | Facility: CLINIC | Age: 28
End: 2023-10-02

## 2024-02-21 PROBLEM — Z01.419 ENCOUNTER FOR GYNECOLOGICAL EXAMINATION (GENERAL) (ROUTINE) WITHOUT ABNORMAL FINDINGS: Status: RESOLVED | Noted: 2018-07-31 | Resolved: 2024-02-21

## 2024-03-22 ENCOUNTER — ANNUAL EXAM (OUTPATIENT)
Dept: GYNECOLOGY | Facility: CLINIC | Age: 29
End: 2024-03-22
Payer: COMMERCIAL

## 2024-03-22 VITALS
HEIGHT: 61 IN | DIASTOLIC BLOOD PRESSURE: 70 MMHG | SYSTOLIC BLOOD PRESSURE: 122 MMHG | BODY MASS INDEX: 23.41 KG/M2 | WEIGHT: 124 LBS

## 2024-03-22 DIAGNOSIS — Z01.419 ENCOUNTER FOR WELL WOMAN EXAM: Primary | ICD-10-CM

## 2024-03-22 DIAGNOSIS — Z12.39 ENCOUNTER FOR SCREENING BREAST EXAMINATION: ICD-10-CM

## 2024-03-22 PROCEDURE — S0612 ANNUAL GYNECOLOGICAL EXAMINA: HCPCS | Performed by: PHYSICIAN ASSISTANT

## 2024-03-22 NOTE — PROGRESS NOTES
"Assessment/Plan:    No problem-specific Assessment & Plan notes found for this encounter.       Diagnoses and all orders for this visit:    Encounter for well woman exam    Encounter for screening breast examination          Subjective:      Patient ID: Tonya Escudero is a 28 y.o. female.    Pt presents for her annual exam today--  She has no complaints except supposed to get period for her wedding/honeymoon next month--would like to try to skip with OCP  She has regular cycles  No pelvic pain  Bowel and bladder are regular  No breast concerns today  Pt is s/p slapingectomy so does not need to worry about BC    No pap today.    Samples of lolo given and instructions given as well        The following portions of the patient's history were reviewed and updated as appropriate: allergies, current medications, past family history, past medical history, past social history, past surgical history, and problem list.    Review of Systems   Constitutional:  Negative for chills, fever and unexpected weight change.   HENT:  Negative for ear pain and sore throat.    Eyes:  Negative for pain and visual disturbance.   Respiratory:  Negative for cough and shortness of breath.    Cardiovascular:  Negative for chest pain and palpitations.   Gastrointestinal:  Negative for abdominal pain, blood in stool, constipation, diarrhea and vomiting.   Genitourinary: Negative.  Negative for dysuria and hematuria.   Musculoskeletal:  Negative for arthralgias and back pain.   Skin:  Negative for color change and rash.   Neurological:  Negative for seizures and syncope.   All other systems reviewed and are negative.        Objective:      /70   Ht 5' 1\" (1.549 m)   Wt 56.2 kg (124 lb)   LMP 03/04/2024 (Exact Date)   BMI 23.43 kg/m²          Physical Exam  Vitals and nursing note reviewed.   Constitutional:       Appearance: Normal appearance. She is well-developed.   HENT:      Head: Normocephalic and atraumatic.   Chest:   Breasts:    "  Right: No inverted nipple, mass, nipple discharge or skin change.      Left: No inverted nipple, mass, nipple discharge or skin change.   Abdominal:      Palpations: Abdomen is soft.   Genitourinary:     Exam position: Supine.      Labia:         Right: No rash, tenderness or lesion.         Left: No rash, tenderness or lesion.       Vagina: Normal.      Cervix: No cervical motion tenderness, discharge or friability.      Uterus: Normal.       Adnexa:         Right: No mass, tenderness or fullness.          Left: No mass, tenderness or fullness.     Musculoskeletal:      Cervical back: Normal range of motion.   Lymphadenopathy:      Lower Body: No right inguinal adenopathy. No left inguinal adenopathy.   Neurological:      Mental Status: She is alert.

## 2024-05-21 DIAGNOSIS — Z30.011 INITIATION OF OCP (BCP): Primary | ICD-10-CM

## 2024-05-21 RX ORDER — NORETHINDRONE ACETATE AND ETHINYL ESTRADIOL 1MG-20(21)
1 KIT ORAL DAILY
Qty: 30 TABLET | Refills: 9 | Status: SHIPPED | OUTPATIENT
Start: 2024-05-21

## 2024-06-04 DIAGNOSIS — Z30.011 INITIATION OF OCP (BCP): ICD-10-CM

## 2024-06-04 RX ORDER — NORETHINDRONE ACETATE AND ETHINYL ESTRADIOL AND FERROUS FUMARATE 1MG-20(21)
1 KIT ORAL DAILY
Qty: 84 TABLET | Refills: 1 | Status: SHIPPED | OUTPATIENT
Start: 2024-06-04